# Patient Record
Sex: MALE | Race: WHITE | NOT HISPANIC OR LATINO | URBAN - METROPOLITAN AREA
[De-identification: names, ages, dates, MRNs, and addresses within clinical notes are randomized per-mention and may not be internally consistent; named-entity substitution may affect disease eponyms.]

---

## 2017-01-19 ENCOUNTER — OUTPATIENT (OUTPATIENT)
Dept: OUTPATIENT SERVICES | Facility: HOSPITAL | Age: 77
LOS: 1 days | Discharge: HOME | End: 2017-01-19

## 2017-06-15 ENCOUNTER — OUTPATIENT (OUTPATIENT)
Dept: OUTPATIENT SERVICES | Facility: HOSPITAL | Age: 77
LOS: 1 days | Discharge: HOME | End: 2017-06-15

## 2017-06-15 DIAGNOSIS — I25.10 ATHEROSCLEROTIC HEART DISEASE OF NATIVE CORONARY ARTERY WITHOUT ANGINA PECTORIS: ICD-10-CM

## 2017-06-15 DIAGNOSIS — E11.9 TYPE 2 DIABETES MELLITUS WITHOUT COMPLICATIONS: ICD-10-CM

## 2017-06-15 DIAGNOSIS — I20.9 ANGINA PECTORIS, UNSPECIFIED: ICD-10-CM

## 2017-06-15 DIAGNOSIS — E78.5 HYPERLIPIDEMIA, UNSPECIFIED: ICD-10-CM

## 2017-06-15 DIAGNOSIS — I10 ESSENTIAL (PRIMARY) HYPERTENSION: ICD-10-CM

## 2017-06-28 DIAGNOSIS — I82.91 CHRONIC EMBOLISM AND THROMBOSIS OF UNSPECIFIED VEIN: ICD-10-CM

## 2017-06-28 DIAGNOSIS — Z79.01 LONG TERM (CURRENT) USE OF ANTICOAGULANTS: ICD-10-CM

## 2017-07-13 ENCOUNTER — OUTPATIENT (OUTPATIENT)
Dept: OUTPATIENT SERVICES | Facility: HOSPITAL | Age: 77
LOS: 1 days | Discharge: HOME | End: 2017-07-13

## 2017-07-13 DIAGNOSIS — E11.9 TYPE 2 DIABETES MELLITUS WITHOUT COMPLICATIONS: ICD-10-CM

## 2017-07-13 DIAGNOSIS — E78.5 HYPERLIPIDEMIA, UNSPECIFIED: ICD-10-CM

## 2017-07-13 DIAGNOSIS — I25.10 ATHEROSCLEROTIC HEART DISEASE OF NATIVE CORONARY ARTERY WITHOUT ANGINA PECTORIS: ICD-10-CM

## 2017-07-13 DIAGNOSIS — I82.91 CHRONIC EMBOLISM AND THROMBOSIS OF UNSPECIFIED VEIN: ICD-10-CM

## 2017-07-13 DIAGNOSIS — I20.9 ANGINA PECTORIS, UNSPECIFIED: ICD-10-CM

## 2017-07-13 DIAGNOSIS — I10 ESSENTIAL (PRIMARY) HYPERTENSION: ICD-10-CM

## 2017-07-13 DIAGNOSIS — Z79.01 LONG TERM (CURRENT) USE OF ANTICOAGULANTS: ICD-10-CM

## 2017-08-10 ENCOUNTER — OUTPATIENT (OUTPATIENT)
Dept: OUTPATIENT SERVICES | Facility: HOSPITAL | Age: 77
LOS: 1 days | Discharge: HOME | End: 2017-08-10

## 2017-08-10 DIAGNOSIS — I20.9 ANGINA PECTORIS, UNSPECIFIED: ICD-10-CM

## 2017-08-10 DIAGNOSIS — Z79.01 LONG TERM (CURRENT) USE OF ANTICOAGULANTS: ICD-10-CM

## 2017-08-10 DIAGNOSIS — I10 ESSENTIAL (PRIMARY) HYPERTENSION: ICD-10-CM

## 2017-08-10 DIAGNOSIS — E11.9 TYPE 2 DIABETES MELLITUS WITHOUT COMPLICATIONS: ICD-10-CM

## 2017-08-10 DIAGNOSIS — E78.5 HYPERLIPIDEMIA, UNSPECIFIED: ICD-10-CM

## 2017-08-10 DIAGNOSIS — I82.91 CHRONIC EMBOLISM AND THROMBOSIS OF UNSPECIFIED VEIN: ICD-10-CM

## 2017-08-10 DIAGNOSIS — I25.10 ATHEROSCLEROTIC HEART DISEASE OF NATIVE CORONARY ARTERY WITHOUT ANGINA PECTORIS: ICD-10-CM

## 2017-09-05 ENCOUNTER — OUTPATIENT (OUTPATIENT)
Dept: OUTPATIENT SERVICES | Facility: HOSPITAL | Age: 77
LOS: 1 days | Discharge: HOME | End: 2017-09-05

## 2017-09-05 DIAGNOSIS — Z79.01 LONG TERM (CURRENT) USE OF ANTICOAGULANTS: ICD-10-CM

## 2017-09-05 DIAGNOSIS — I25.10 ATHEROSCLEROTIC HEART DISEASE OF NATIVE CORONARY ARTERY WITHOUT ANGINA PECTORIS: ICD-10-CM

## 2017-09-05 DIAGNOSIS — E11.9 TYPE 2 DIABETES MELLITUS WITHOUT COMPLICATIONS: ICD-10-CM

## 2017-09-05 DIAGNOSIS — I82.91 CHRONIC EMBOLISM AND THROMBOSIS OF UNSPECIFIED VEIN: ICD-10-CM

## 2017-09-05 DIAGNOSIS — E78.5 HYPERLIPIDEMIA, UNSPECIFIED: ICD-10-CM

## 2017-09-05 DIAGNOSIS — I10 ESSENTIAL (PRIMARY) HYPERTENSION: ICD-10-CM

## 2017-09-05 DIAGNOSIS — I20.9 ANGINA PECTORIS, UNSPECIFIED: ICD-10-CM

## 2017-09-07 DIAGNOSIS — Z79.01 LONG TERM (CURRENT) USE OF ANTICOAGULANTS: ICD-10-CM

## 2017-09-07 DIAGNOSIS — I82.91 CHRONIC EMBOLISM AND THROMBOSIS OF UNSPECIFIED VEIN: ICD-10-CM

## 2017-10-17 ENCOUNTER — OUTPATIENT (OUTPATIENT)
Dept: OUTPATIENT SERVICES | Facility: HOSPITAL | Age: 77
LOS: 1 days | Discharge: HOME | End: 2017-10-17

## 2017-10-17 DIAGNOSIS — I82.91 CHRONIC EMBOLISM AND THROMBOSIS OF UNSPECIFIED VEIN: ICD-10-CM

## 2017-10-17 DIAGNOSIS — E78.5 HYPERLIPIDEMIA, UNSPECIFIED: ICD-10-CM

## 2017-10-17 DIAGNOSIS — I25.10 ATHEROSCLEROTIC HEART DISEASE OF NATIVE CORONARY ARTERY WITHOUT ANGINA PECTORIS: ICD-10-CM

## 2017-10-17 DIAGNOSIS — Z79.01 LONG TERM (CURRENT) USE OF ANTICOAGULANTS: ICD-10-CM

## 2017-10-17 DIAGNOSIS — E11.9 TYPE 2 DIABETES MELLITUS WITHOUT COMPLICATIONS: ICD-10-CM

## 2017-10-17 DIAGNOSIS — I20.9 ANGINA PECTORIS, UNSPECIFIED: ICD-10-CM

## 2017-10-17 DIAGNOSIS — I10 ESSENTIAL (PRIMARY) HYPERTENSION: ICD-10-CM

## 2017-11-28 ENCOUNTER — OUTPATIENT (OUTPATIENT)
Dept: OUTPATIENT SERVICES | Facility: HOSPITAL | Age: 77
LOS: 1 days | Discharge: HOME | End: 2017-11-28

## 2017-11-28 DIAGNOSIS — I20.9 ANGINA PECTORIS, UNSPECIFIED: ICD-10-CM

## 2017-11-28 DIAGNOSIS — I25.10 ATHEROSCLEROTIC HEART DISEASE OF NATIVE CORONARY ARTERY WITHOUT ANGINA PECTORIS: ICD-10-CM

## 2017-11-28 DIAGNOSIS — I10 ESSENTIAL (PRIMARY) HYPERTENSION: ICD-10-CM

## 2017-11-28 DIAGNOSIS — I82.91 CHRONIC EMBOLISM AND THROMBOSIS OF UNSPECIFIED VEIN: ICD-10-CM

## 2017-11-28 DIAGNOSIS — Z79.01 LONG TERM (CURRENT) USE OF ANTICOAGULANTS: ICD-10-CM

## 2017-11-28 DIAGNOSIS — E78.5 HYPERLIPIDEMIA, UNSPECIFIED: ICD-10-CM

## 2017-11-28 DIAGNOSIS — E11.9 TYPE 2 DIABETES MELLITUS WITHOUT COMPLICATIONS: ICD-10-CM

## 2018-01-09 ENCOUNTER — OUTPATIENT (OUTPATIENT)
Dept: OUTPATIENT SERVICES | Facility: HOSPITAL | Age: 78
LOS: 1 days | Discharge: HOME | End: 2018-01-09

## 2018-01-09 DIAGNOSIS — I10 ESSENTIAL (PRIMARY) HYPERTENSION: ICD-10-CM

## 2018-01-09 DIAGNOSIS — Z79.01 LONG TERM (CURRENT) USE OF ANTICOAGULANTS: ICD-10-CM

## 2018-01-09 DIAGNOSIS — I82.91 CHRONIC EMBOLISM AND THROMBOSIS OF UNSPECIFIED VEIN: ICD-10-CM

## 2018-01-09 DIAGNOSIS — E78.5 HYPERLIPIDEMIA, UNSPECIFIED: ICD-10-CM

## 2018-01-09 DIAGNOSIS — I25.10 ATHEROSCLEROTIC HEART DISEASE OF NATIVE CORONARY ARTERY WITHOUT ANGINA PECTORIS: ICD-10-CM

## 2018-01-09 DIAGNOSIS — I20.9 ANGINA PECTORIS, UNSPECIFIED: ICD-10-CM

## 2018-01-09 DIAGNOSIS — E11.9 TYPE 2 DIABETES MELLITUS WITHOUT COMPLICATIONS: ICD-10-CM

## 2018-02-06 ENCOUNTER — OUTPATIENT (OUTPATIENT)
Dept: OUTPATIENT SERVICES | Facility: HOSPITAL | Age: 78
LOS: 1 days | Discharge: HOME | End: 2018-02-06

## 2018-02-06 DIAGNOSIS — Z79.01 LONG TERM (CURRENT) USE OF ANTICOAGULANTS: ICD-10-CM

## 2018-02-06 DIAGNOSIS — I82.91 CHRONIC EMBOLISM AND THROMBOSIS OF UNSPECIFIED VEIN: ICD-10-CM

## 2018-02-13 ENCOUNTER — OUTPATIENT (OUTPATIENT)
Dept: OUTPATIENT SERVICES | Facility: HOSPITAL | Age: 78
LOS: 1 days | Discharge: HOME | End: 2018-02-13

## 2018-02-13 DIAGNOSIS — Z79.01 LONG TERM (CURRENT) USE OF ANTICOAGULANTS: ICD-10-CM

## 2018-02-13 DIAGNOSIS — I82.91 CHRONIC EMBOLISM AND THROMBOSIS OF UNSPECIFIED VEIN: ICD-10-CM

## 2018-02-27 ENCOUNTER — OUTPATIENT (OUTPATIENT)
Dept: OUTPATIENT SERVICES | Facility: HOSPITAL | Age: 78
LOS: 1 days | Discharge: HOME | End: 2018-02-27

## 2018-02-27 DIAGNOSIS — I82.91 CHRONIC EMBOLISM AND THROMBOSIS OF UNSPECIFIED VEIN: ICD-10-CM

## 2018-02-27 DIAGNOSIS — Z79.01 LONG TERM (CURRENT) USE OF ANTICOAGULANTS: ICD-10-CM

## 2018-03-27 ENCOUNTER — OUTPATIENT (OUTPATIENT)
Dept: OUTPATIENT SERVICES | Facility: HOSPITAL | Age: 78
LOS: 1 days | Discharge: HOME | End: 2018-03-27

## 2018-03-27 DIAGNOSIS — Z79.01 LONG TERM (CURRENT) USE OF ANTICOAGULANTS: ICD-10-CM

## 2018-03-27 DIAGNOSIS — I82.91 CHRONIC EMBOLISM AND THROMBOSIS OF UNSPECIFIED VEIN: ICD-10-CM

## 2018-05-08 ENCOUNTER — OUTPATIENT (OUTPATIENT)
Dept: OUTPATIENT SERVICES | Facility: HOSPITAL | Age: 78
LOS: 1 days | Discharge: HOME | End: 2018-05-08

## 2018-05-08 DIAGNOSIS — I82.91 CHRONIC EMBOLISM AND THROMBOSIS OF UNSPECIFIED VEIN: ICD-10-CM

## 2018-05-08 DIAGNOSIS — Z79.01 LONG TERM (CURRENT) USE OF ANTICOAGULANTS: ICD-10-CM

## 2018-06-19 ENCOUNTER — OUTPATIENT (OUTPATIENT)
Dept: OUTPATIENT SERVICES | Facility: HOSPITAL | Age: 78
LOS: 1 days | Discharge: HOME | End: 2018-06-19

## 2018-06-19 DIAGNOSIS — Z79.01 LONG TERM (CURRENT) USE OF ANTICOAGULANTS: ICD-10-CM

## 2018-06-19 DIAGNOSIS — I82.91 CHRONIC EMBOLISM AND THROMBOSIS OF UNSPECIFIED VEIN: ICD-10-CM

## 2018-08-07 ENCOUNTER — OUTPATIENT (OUTPATIENT)
Dept: OUTPATIENT SERVICES | Facility: HOSPITAL | Age: 78
LOS: 1 days | Discharge: HOME | End: 2018-08-07

## 2018-08-07 DIAGNOSIS — Z79.01 LONG TERM (CURRENT) USE OF ANTICOAGULANTS: ICD-10-CM

## 2018-08-07 DIAGNOSIS — I82.91 CHRONIC EMBOLISM AND THROMBOSIS OF UNSPECIFIED VEIN: ICD-10-CM

## 2018-09-21 ENCOUNTER — OUTPATIENT (OUTPATIENT)
Dept: OUTPATIENT SERVICES | Facility: HOSPITAL | Age: 78
LOS: 1 days | Discharge: HOME | End: 2018-09-21

## 2018-09-21 DIAGNOSIS — Z79.01 LONG TERM (CURRENT) USE OF ANTICOAGULANTS: ICD-10-CM

## 2018-09-21 DIAGNOSIS — I82.91 CHRONIC EMBOLISM AND THROMBOSIS OF UNSPECIFIED VEIN: ICD-10-CM

## 2018-09-21 LAB
POCT INR: 2.8 RATIO — HIGH (ref 0.9–1.2)
POCT PT: 33.5 SEC — HIGH (ref 10–13.4)

## 2018-10-24 ENCOUNTER — OUTPATIENT (OUTPATIENT)
Dept: OUTPATIENT SERVICES | Facility: HOSPITAL | Age: 78
LOS: 1 days | Discharge: HOME | End: 2018-10-24

## 2018-10-24 DIAGNOSIS — Z79.01 LONG TERM (CURRENT) USE OF ANTICOAGULANTS: ICD-10-CM

## 2018-10-24 DIAGNOSIS — I82.91 CHRONIC EMBOLISM AND THROMBOSIS OF UNSPECIFIED VEIN: ICD-10-CM

## 2018-10-24 LAB
POCT INR: 2.6 RATIO — HIGH (ref 0.9–1.2)
POCT PT: 31.2 SEC — HIGH (ref 10–13.4)

## 2018-10-29 ENCOUNTER — OUTPATIENT (OUTPATIENT)
Dept: OUTPATIENT SERVICES | Facility: HOSPITAL | Age: 78
LOS: 1 days | Discharge: HOME | End: 2018-10-29

## 2018-10-29 DIAGNOSIS — I82.91 CHRONIC EMBOLISM AND THROMBOSIS OF UNSPECIFIED VEIN: ICD-10-CM

## 2018-10-29 DIAGNOSIS — Z79.01 LONG TERM (CURRENT) USE OF ANTICOAGULANTS: ICD-10-CM

## 2018-12-11 ENCOUNTER — OUTPATIENT (OUTPATIENT)
Dept: OUTPATIENT SERVICES | Facility: HOSPITAL | Age: 78
LOS: 1 days | Discharge: HOME | End: 2018-12-11

## 2018-12-11 DIAGNOSIS — Z79.01 LONG TERM (CURRENT) USE OF ANTICOAGULANTS: ICD-10-CM

## 2018-12-11 DIAGNOSIS — I82.91 CHRONIC EMBOLISM AND THROMBOSIS OF UNSPECIFIED VEIN: ICD-10-CM

## 2018-12-11 LAB
POCT INR: 2.5 RATIO — HIGH (ref 0.9–1.2)
POCT PT: 30.4 SEC — HIGH (ref 10–13.4)

## 2019-01-22 ENCOUNTER — OUTPATIENT (OUTPATIENT)
Dept: OUTPATIENT SERVICES | Facility: HOSPITAL | Age: 79
LOS: 1 days | Discharge: HOME | End: 2019-01-22

## 2019-01-22 DIAGNOSIS — I82.91 CHRONIC EMBOLISM AND THROMBOSIS OF UNSPECIFIED VEIN: ICD-10-CM

## 2019-01-22 DIAGNOSIS — Z79.01 LONG TERM (CURRENT) USE OF ANTICOAGULANTS: ICD-10-CM

## 2019-01-22 LAB
POCT INR: 2 RATIO — HIGH (ref 0.9–1.2)
POCT PT: 24.6 SEC — HIGH (ref 10–13.4)

## 2019-03-07 ENCOUNTER — OUTPATIENT (OUTPATIENT)
Dept: OUTPATIENT SERVICES | Facility: HOSPITAL | Age: 79
LOS: 1 days | Discharge: HOME | End: 2019-03-07

## 2019-03-07 DIAGNOSIS — Z79.01 LONG TERM (CURRENT) USE OF ANTICOAGULANTS: ICD-10-CM

## 2019-03-07 DIAGNOSIS — I82.91 CHRONIC EMBOLISM AND THROMBOSIS OF UNSPECIFIED VEIN: ICD-10-CM

## 2019-03-07 LAB
POCT INR: 2.1 RATIO — HIGH (ref 0.9–1.2)
POCT PT: 25.1 SEC — HIGH (ref 10–13.4)

## 2019-04-18 ENCOUNTER — OUTPATIENT (OUTPATIENT)
Dept: OUTPATIENT SERVICES | Facility: HOSPITAL | Age: 79
LOS: 1 days | Discharge: HOME | End: 2019-04-18

## 2019-04-18 DIAGNOSIS — Z79.01 LONG TERM (CURRENT) USE OF ANTICOAGULANTS: ICD-10-CM

## 2019-04-18 DIAGNOSIS — I82.91 CHRONIC EMBOLISM AND THROMBOSIS OF UNSPECIFIED VEIN: ICD-10-CM

## 2019-04-18 LAB
POCT INR: 1.7 RATIO — HIGH (ref 0.9–1.2)
POCT PT: 20.2 SEC — HIGH (ref 10–13.4)

## 2019-05-02 ENCOUNTER — OUTPATIENT (OUTPATIENT)
Dept: OUTPATIENT SERVICES | Facility: HOSPITAL | Age: 79
LOS: 1 days | Discharge: HOME | End: 2019-05-02

## 2019-05-02 DIAGNOSIS — I82.91 CHRONIC EMBOLISM AND THROMBOSIS OF UNSPECIFIED VEIN: ICD-10-CM

## 2019-05-02 DIAGNOSIS — Z79.01 LONG TERM (CURRENT) USE OF ANTICOAGULANTS: ICD-10-CM

## 2019-05-02 LAB
POCT INR: 2.2 RATIO — HIGH (ref 0.9–1.2)
POCT PT: 25.8 SEC — HIGH (ref 10–13.4)

## 2019-06-06 ENCOUNTER — OUTPATIENT (OUTPATIENT)
Dept: OUTPATIENT SERVICES | Facility: HOSPITAL | Age: 79
LOS: 1 days | Discharge: HOME | End: 2019-06-06

## 2019-06-06 DIAGNOSIS — Z79.01 LONG TERM (CURRENT) USE OF ANTICOAGULANTS: ICD-10-CM

## 2019-06-06 DIAGNOSIS — I82.91 CHRONIC EMBOLISM AND THROMBOSIS OF UNSPECIFIED VEIN: ICD-10-CM

## 2019-06-06 LAB
POCT INR: 2.1 RATIO — HIGH (ref 0.9–1.2)
POCT PT: 25.2 SEC — HIGH (ref 10–13.4)

## 2019-07-18 ENCOUNTER — OUTPATIENT (OUTPATIENT)
Dept: OUTPATIENT SERVICES | Facility: HOSPITAL | Age: 79
LOS: 1 days | Discharge: HOME | End: 2019-07-18

## 2019-07-18 DIAGNOSIS — Z79.01 LONG TERM (CURRENT) USE OF ANTICOAGULANTS: ICD-10-CM

## 2019-07-18 DIAGNOSIS — I82.91 CHRONIC EMBOLISM AND THROMBOSIS OF UNSPECIFIED VEIN: ICD-10-CM

## 2019-07-18 LAB
POCT INR: 1.8 RATIO — HIGH (ref 0.9–1.2)
POCT PT: 22.3 SEC — HIGH (ref 10–13.4)

## 2019-08-08 ENCOUNTER — OUTPATIENT (OUTPATIENT)
Dept: OUTPATIENT SERVICES | Facility: HOSPITAL | Age: 79
LOS: 1 days | Discharge: HOME | End: 2019-08-08

## 2019-08-08 DIAGNOSIS — Z79.01 LONG TERM (CURRENT) USE OF ANTICOAGULANTS: ICD-10-CM

## 2019-08-08 DIAGNOSIS — I82.91 CHRONIC EMBOLISM AND THROMBOSIS OF UNSPECIFIED VEIN: ICD-10-CM

## 2019-08-08 LAB
POCT INR: 2.2 RATIO — HIGH (ref 0.9–1.2)
POCT PT: 26.4 SEC — HIGH (ref 10–13.4)

## 2019-08-22 ENCOUNTER — OUTPATIENT (OUTPATIENT)
Dept: OUTPATIENT SERVICES | Facility: HOSPITAL | Age: 79
LOS: 1 days | Discharge: HOME | End: 2019-08-22

## 2019-08-22 DIAGNOSIS — I82.91 CHRONIC EMBOLISM AND THROMBOSIS OF UNSPECIFIED VEIN: ICD-10-CM

## 2019-08-22 DIAGNOSIS — Z79.01 LONG TERM (CURRENT) USE OF ANTICOAGULANTS: ICD-10-CM

## 2019-08-22 LAB
POCT INR: 2.1 RATIO — HIGH (ref 0.9–1.2)
POCT PT: 25.5 SEC — HIGH (ref 10–13.4)

## 2019-10-03 ENCOUNTER — OUTPATIENT (OUTPATIENT)
Dept: OUTPATIENT SERVICES | Facility: HOSPITAL | Age: 79
LOS: 1 days | Discharge: HOME | End: 2019-10-03

## 2019-10-03 DIAGNOSIS — I82.91 CHRONIC EMBOLISM AND THROMBOSIS OF UNSPECIFIED VEIN: ICD-10-CM

## 2019-10-03 DIAGNOSIS — Z79.01 LONG TERM (CURRENT) USE OF ANTICOAGULANTS: ICD-10-CM

## 2019-10-03 LAB
POCT INR: 3.6 RATIO — HIGH (ref 0.9–1.2)
POCT PT: 43.2 SEC — HIGH (ref 10–13.4)

## 2019-10-10 ENCOUNTER — OUTPATIENT (OUTPATIENT)
Dept: OUTPATIENT SERVICES | Facility: HOSPITAL | Age: 79
LOS: 1 days | Discharge: HOME | End: 2019-10-10

## 2019-10-10 DIAGNOSIS — I82.91 CHRONIC EMBOLISM AND THROMBOSIS OF UNSPECIFIED VEIN: ICD-10-CM

## 2019-10-10 DIAGNOSIS — Z79.01 LONG TERM (CURRENT) USE OF ANTICOAGULANTS: ICD-10-CM

## 2019-10-10 LAB
POCT INR: 2.3 RATIO — HIGH (ref 0.9–1.2)
POCT PT: 27.9 SEC — HIGH (ref 10–13.4)

## 2019-10-22 ENCOUNTER — OUTPATIENT (OUTPATIENT)
Dept: OUTPATIENT SERVICES | Facility: HOSPITAL | Age: 79
LOS: 1 days | Discharge: HOME | End: 2019-10-22

## 2019-10-22 DIAGNOSIS — I82.91 CHRONIC EMBOLISM AND THROMBOSIS OF UNSPECIFIED VEIN: ICD-10-CM

## 2019-10-22 DIAGNOSIS — Z79.01 LONG TERM (CURRENT) USE OF ANTICOAGULANTS: ICD-10-CM

## 2019-10-22 LAB
POCT INR: 2.8 RATIO — HIGH (ref 0.9–1.2)
POCT PT: 33.6 SEC — HIGH (ref 10–13.4)

## 2019-11-21 ENCOUNTER — OUTPATIENT (OUTPATIENT)
Dept: OUTPATIENT SERVICES | Facility: HOSPITAL | Age: 79
LOS: 1 days | Discharge: HOME | End: 2019-11-21

## 2019-11-21 DIAGNOSIS — I82.91 CHRONIC EMBOLISM AND THROMBOSIS OF UNSPECIFIED VEIN: ICD-10-CM

## 2019-11-21 DIAGNOSIS — Z79.01 LONG TERM (CURRENT) USE OF ANTICOAGULANTS: ICD-10-CM

## 2019-11-21 LAB
POCT INR: 2.7 RATIO — HIGH (ref 0.9–1.2)
POCT PT: 32.8 SEC — HIGH (ref 10–13.4)

## 2019-12-26 ENCOUNTER — OUTPATIENT (OUTPATIENT)
Dept: OUTPATIENT SERVICES | Facility: HOSPITAL | Age: 79
LOS: 1 days | Discharge: HOME | End: 2019-12-26

## 2019-12-26 DIAGNOSIS — Z79.01 LONG TERM (CURRENT) USE OF ANTICOAGULANTS: ICD-10-CM

## 2019-12-26 DIAGNOSIS — I82.91 CHRONIC EMBOLISM AND THROMBOSIS OF UNSPECIFIED VEIN: ICD-10-CM

## 2019-12-26 LAB
POCT INR: 2.4 RATIO — HIGH (ref 0.9–1.2)
POCT PT: 29 SEC — HIGH (ref 10–13.4)

## 2020-01-28 ENCOUNTER — OUTPATIENT (OUTPATIENT)
Dept: OUTPATIENT SERVICES | Facility: HOSPITAL | Age: 80
LOS: 1 days | Discharge: HOME | End: 2020-01-28

## 2020-01-28 DIAGNOSIS — Z79.01 LONG TERM (CURRENT) USE OF ANTICOAGULANTS: ICD-10-CM

## 2020-01-28 DIAGNOSIS — I82.91 CHRONIC EMBOLISM AND THROMBOSIS OF UNSPECIFIED VEIN: ICD-10-CM

## 2020-01-28 LAB
POCT INR: 2.7 RATIO — HIGH (ref 0.9–1.2)
POCT PT: 33 SEC — HIGH (ref 10–13.4)

## 2020-03-10 ENCOUNTER — OUTPATIENT (OUTPATIENT)
Dept: OUTPATIENT SERVICES | Facility: HOSPITAL | Age: 80
LOS: 1 days | Discharge: HOME | End: 2020-03-10

## 2020-03-10 DIAGNOSIS — Z79.01 LONG TERM (CURRENT) USE OF ANTICOAGULANTS: ICD-10-CM

## 2020-03-10 DIAGNOSIS — I82.91 CHRONIC EMBOLISM AND THROMBOSIS OF UNSPECIFIED VEIN: ICD-10-CM

## 2020-03-10 LAB
POCT INR: 2.8 RATIO — HIGH (ref 0.9–1.2)
POCT PT: 33.4 SEC — HIGH (ref 10–13.4)

## 2020-04-21 ENCOUNTER — OUTPATIENT (OUTPATIENT)
Dept: OUTPATIENT SERVICES | Facility: HOSPITAL | Age: 80
LOS: 1 days | Discharge: HOME | End: 2020-04-21

## 2020-04-21 DIAGNOSIS — I82.91 CHRONIC EMBOLISM AND THROMBOSIS OF UNSPECIFIED VEIN: ICD-10-CM

## 2020-04-21 DIAGNOSIS — Z79.01 LONG TERM (CURRENT) USE OF ANTICOAGULANTS: ICD-10-CM

## 2020-04-21 LAB
POCT INR: 3.2 RATIO — HIGH (ref 0.9–1.2)
POCT PT: 38.7 SEC — HIGH (ref 10–13.4)

## 2020-05-19 ENCOUNTER — OUTPATIENT (OUTPATIENT)
Dept: OUTPATIENT SERVICES | Facility: HOSPITAL | Age: 80
LOS: 1 days | Discharge: HOME | End: 2020-05-19

## 2020-05-19 DIAGNOSIS — Z79.01 LONG TERM (CURRENT) USE OF ANTICOAGULANTS: ICD-10-CM

## 2020-05-19 DIAGNOSIS — I82.91 CHRONIC EMBOLISM AND THROMBOSIS OF UNSPECIFIED VEIN: ICD-10-CM

## 2020-05-19 LAB
POCT INR: 2.4 RATIO — HIGH (ref 0.9–1.2)
POCT PT: 28.7 SEC — HIGH (ref 10–13.4)

## 2020-06-30 ENCOUNTER — OUTPATIENT (OUTPATIENT)
Dept: OUTPATIENT SERVICES | Facility: HOSPITAL | Age: 80
LOS: 1 days | Discharge: HOME | End: 2020-06-30

## 2020-06-30 DIAGNOSIS — I82.91 CHRONIC EMBOLISM AND THROMBOSIS OF UNSPECIFIED VEIN: ICD-10-CM

## 2020-06-30 DIAGNOSIS — Z79.01 LONG TERM (CURRENT) USE OF ANTICOAGULANTS: ICD-10-CM

## 2020-06-30 LAB
POCT INR: 2.4 RATIO — HIGH (ref 0.9–1.2)
POCT PT: 28.6 SEC — HIGH (ref 10–13.4)

## 2020-08-06 ENCOUNTER — RECORD ABSTRACTING (OUTPATIENT)
Age: 80
End: 2020-08-06

## 2020-08-06 DIAGNOSIS — Z87.898 PERSONAL HISTORY OF OTHER SPECIFIED CONDITIONS: ICD-10-CM

## 2020-08-06 DIAGNOSIS — Z78.9 OTHER SPECIFIED HEALTH STATUS: ICD-10-CM

## 2020-08-06 DIAGNOSIS — I10 ESSENTIAL (PRIMARY) HYPERTENSION: ICD-10-CM

## 2020-08-06 DIAGNOSIS — I82.91 CHRONIC EMBOLISM AND THROMBOSIS OF UNSPECIFIED VEIN: ICD-10-CM

## 2020-08-06 DIAGNOSIS — E11.9 TYPE 2 DIABETES MELLITUS W/OUT COMPLICATIONS: ICD-10-CM

## 2020-08-06 DIAGNOSIS — Z79.4 LONG TERM (CURRENT) USE OF INSULIN: ICD-10-CM

## 2020-08-06 DIAGNOSIS — I50.9 HEART FAILURE, UNSPECIFIED: ICD-10-CM

## 2020-08-06 PROBLEM — Z00.00 ENCOUNTER FOR PREVENTIVE HEALTH EXAMINATION: Status: ACTIVE | Noted: 2020-08-06

## 2020-08-06 RX ORDER — SITAGLIPTIN 50 MG/1
50 TABLET, FILM COATED ORAL
Refills: 0 | Status: ACTIVE | COMMUNITY

## 2020-08-06 RX ORDER — SIMVASTATIN 40 MG/1
40 TABLET, FILM COATED ORAL
Refills: 0 | Status: ACTIVE | COMMUNITY

## 2020-08-06 RX ORDER — CARVEDILOL 6.25 MG/1
6.25 TABLET, FILM COATED ORAL
Refills: 0 | Status: ACTIVE | COMMUNITY

## 2020-08-06 RX ORDER — ASPIRIN 81 MG
81 TABLET, DELAYED RELEASE (ENTERIC COATED) ORAL
Refills: 0 | Status: ACTIVE | COMMUNITY

## 2020-08-06 RX ORDER — WARFARIN 3 MG/1
3 TABLET ORAL
Refills: 0 | Status: ACTIVE | COMMUNITY

## 2020-08-06 RX ORDER — GLIPIZIDE 5 MG/1
5 TABLET ORAL
Refills: 0 | Status: ACTIVE | COMMUNITY

## 2020-08-06 RX ORDER — FUROSEMIDE 40 MG/1
40 TABLET ORAL
Refills: 0 | Status: ACTIVE | COMMUNITY

## 2020-08-06 RX ORDER — FOLIC ACID 1 MG/1
1 TABLET ORAL
Refills: 0 | Status: ACTIVE | COMMUNITY

## 2020-08-06 RX ORDER — METFORMIN HYDROCHLORIDE 1000 MG/1
1000 TABLET, COATED ORAL
Refills: 0 | Status: ACTIVE | COMMUNITY

## 2020-08-06 RX ORDER — PANTOPRAZOLE 40 MG/1
40 TABLET, DELAYED RELEASE ORAL
Refills: 0 | Status: ACTIVE | COMMUNITY

## 2020-08-11 ENCOUNTER — OUTPATIENT (OUTPATIENT)
Dept: OUTPATIENT SERVICES | Facility: HOSPITAL | Age: 80
LOS: 1 days | Discharge: HOME | End: 2020-08-11

## 2020-08-11 ENCOUNTER — APPOINTMENT (OUTPATIENT)
Dept: MEDICATION MANAGEMENT | Facility: CLINIC | Age: 80
End: 2020-08-11

## 2020-08-11 VITALS — OXYGEN SATURATION: 98 % | RESPIRATION RATE: 16 BRPM | HEART RATE: 56 BPM

## 2020-08-11 DIAGNOSIS — I26.09 OTHER PULMONARY EMBOLISM WITH ACUTE COR PULMONALE: ICD-10-CM

## 2020-08-11 DIAGNOSIS — Z79.01 LONG TERM (CURRENT) USE OF ANTICOAGULANTS: ICD-10-CM

## 2020-08-11 DIAGNOSIS — I82.91 CHRONIC EMBOLISM AND THROMBOSIS OF UNSPECIFIED VEIN: ICD-10-CM

## 2020-08-11 DIAGNOSIS — Z79.01 ENCOUNTER FOR THERAPEUTIC DRUG LVL MONITORING: ICD-10-CM

## 2020-08-11 DIAGNOSIS — Z51.81 ENCOUNTER FOR THERAPEUTIC DRUG LVL MONITORING: ICD-10-CM

## 2020-08-11 LAB
INR PPP: 3.1 RATIO
POCT-PROTHROMBIN TIME: 37.1 SECS
QUALITY CONTROL: YES

## 2020-09-15 ENCOUNTER — APPOINTMENT (OUTPATIENT)
Dept: MEDICATION MANAGEMENT | Facility: CLINIC | Age: 80
End: 2020-09-15

## 2020-09-15 ENCOUNTER — OUTPATIENT (OUTPATIENT)
Dept: OUTPATIENT SERVICES | Facility: HOSPITAL | Age: 80
LOS: 1 days | Discharge: HOME | End: 2020-09-15

## 2020-09-15 VITALS — OXYGEN SATURATION: 98 % | HEART RATE: 53 BPM | RESPIRATION RATE: 18 BRPM

## 2020-09-15 DIAGNOSIS — I82.91 CHRONIC EMBOLISM AND THROMBOSIS OF UNSPECIFIED VEIN: ICD-10-CM

## 2020-09-15 DIAGNOSIS — Z79.01 LONG TERM (CURRENT) USE OF ANTICOAGULANTS: ICD-10-CM

## 2020-09-15 DIAGNOSIS — Z86.79 PERSONAL HISTORY OF OTHER DISEASES OF THE CIRCULATORY SYSTEM: ICD-10-CM

## 2020-09-15 LAB
INR PPP: 2.6 RATIO
POCT-PROTHROMBIN TIME: 31 SECS
QUALITY CONTROL: YES

## 2020-09-15 RX ORDER — LISINOPRIL 5 MG/1
5 TABLET ORAL
Refills: 0 | Status: ACTIVE | COMMUNITY

## 2020-09-15 RX ORDER — GLYBURIDE 5 MG/1
5 TABLET ORAL
Refills: 0 | Status: ACTIVE | COMMUNITY

## 2020-10-20 ENCOUNTER — APPOINTMENT (OUTPATIENT)
Dept: MEDICATION MANAGEMENT | Facility: CLINIC | Age: 80
End: 2020-10-20

## 2020-10-20 ENCOUNTER — OUTPATIENT (OUTPATIENT)
Dept: OUTPATIENT SERVICES | Facility: HOSPITAL | Age: 80
LOS: 1 days | Discharge: HOME | End: 2020-10-20

## 2020-10-20 VITALS — RESPIRATION RATE: 18 BRPM | OXYGEN SATURATION: 98 % | HEART RATE: 58 BPM

## 2020-10-20 DIAGNOSIS — Z79.01 LONG TERM (CURRENT) USE OF ANTICOAGULANTS: ICD-10-CM

## 2020-10-20 DIAGNOSIS — I82.91 CHRONIC EMBOLISM AND THROMBOSIS OF UNSPECIFIED VEIN: ICD-10-CM

## 2020-10-20 LAB
INR PPP: 2.9 RATIO
POCT-PROTHROMBIN TIME: 35.2 SECS
QUALITY CONTROL: YES

## 2020-11-04 RX ORDER — CYCLOBENZAPRINE HYDROCHLORIDE 5 MG/1
5 TABLET, FILM COATED ORAL 3 TIMES DAILY
Refills: 0 | Status: ACTIVE | COMMUNITY
Start: 2020-11-04

## 2020-11-24 ENCOUNTER — OUTPATIENT (OUTPATIENT)
Dept: OUTPATIENT SERVICES | Facility: HOSPITAL | Age: 80
LOS: 1 days | Discharge: HOME | End: 2020-11-24

## 2020-11-24 ENCOUNTER — APPOINTMENT (OUTPATIENT)
Dept: MEDICATION MANAGEMENT | Facility: CLINIC | Age: 80
End: 2020-11-24

## 2020-11-24 VITALS — OXYGEN SATURATION: 98 % | RESPIRATION RATE: 18 BRPM | HEART RATE: 67 BPM

## 2020-11-24 DIAGNOSIS — I82.91 CHRONIC EMBOLISM AND THROMBOSIS OF UNSPECIFIED VEIN: ICD-10-CM

## 2020-11-24 DIAGNOSIS — Z79.01 LONG TERM (CURRENT) USE OF ANTICOAGULANTS: ICD-10-CM

## 2020-11-24 LAB
INR PPP: 3.8 RATIO
POCT-PROTHROMBIN TIME: 45.3 SECS
QUALITY CONTROL: YES

## 2020-12-08 ENCOUNTER — OUTPATIENT (OUTPATIENT)
Dept: OUTPATIENT SERVICES | Facility: HOSPITAL | Age: 80
LOS: 1 days | Discharge: HOME | End: 2020-12-08

## 2020-12-08 ENCOUNTER — APPOINTMENT (OUTPATIENT)
Dept: MEDICATION MANAGEMENT | Facility: CLINIC | Age: 80
End: 2020-12-08

## 2020-12-08 VITALS — HEART RATE: 63 BPM | RESPIRATION RATE: 18 BRPM | OXYGEN SATURATION: 97 %

## 2020-12-08 DIAGNOSIS — Z79.01 LONG TERM (CURRENT) USE OF ANTICOAGULANTS: ICD-10-CM

## 2020-12-08 DIAGNOSIS — I82.91 CHRONIC EMBOLISM AND THROMBOSIS OF UNSPECIFIED VEIN: ICD-10-CM

## 2020-12-08 LAB
INR PPP: 1.8 RATIO
POCT-PROTHROMBIN TIME: 22 SECS
QUALITY CONTROL: YES

## 2020-12-29 ENCOUNTER — APPOINTMENT (OUTPATIENT)
Dept: MEDICATION MANAGEMENT | Facility: CLINIC | Age: 80
End: 2020-12-29

## 2020-12-29 ENCOUNTER — OUTPATIENT (OUTPATIENT)
Dept: OUTPATIENT SERVICES | Facility: HOSPITAL | Age: 80
LOS: 1 days | Discharge: HOME | End: 2020-12-29

## 2020-12-29 VITALS — RESPIRATION RATE: 18 BRPM | HEART RATE: 63 BPM | OXYGEN SATURATION: 97 %

## 2020-12-29 DIAGNOSIS — I82.91 CHRONIC EMBOLISM AND THROMBOSIS OF UNSPECIFIED VEIN: ICD-10-CM

## 2020-12-29 DIAGNOSIS — Z79.01 LONG TERM (CURRENT) USE OF ANTICOAGULANTS: ICD-10-CM

## 2020-12-29 LAB
INR PPP: 3.7 RATIO
POCT-PROTHROMBIN TIME: 44.1 SECS
QUALITY CONTROL: YES

## 2021-01-05 ENCOUNTER — APPOINTMENT (OUTPATIENT)
Dept: MEDICATION MANAGEMENT | Facility: CLINIC | Age: 81
End: 2021-01-05

## 2021-01-05 ENCOUNTER — OUTPATIENT (OUTPATIENT)
Dept: OUTPATIENT SERVICES | Facility: HOSPITAL | Age: 81
LOS: 1 days | Discharge: HOME | End: 2021-01-05

## 2021-01-05 VITALS — HEART RATE: 65 BPM | RESPIRATION RATE: 18 BRPM | OXYGEN SATURATION: 98 %

## 2021-01-05 DIAGNOSIS — I82.91 CHRONIC EMBOLISM AND THROMBOSIS OF UNSPECIFIED VEIN: ICD-10-CM

## 2021-01-05 DIAGNOSIS — Z79.01 LONG TERM (CURRENT) USE OF ANTICOAGULANTS: ICD-10-CM

## 2021-01-05 LAB
INR PPP: 2.6 RATIO
POCT-PROTHROMBIN TIME: 31.2 SECS
QUALITY CONTROL: YES

## 2021-01-13 VITALS — WEIGHT: 242 LBS

## 2021-01-20 ENCOUNTER — OUTPATIENT (OUTPATIENT)
Dept: OUTPATIENT SERVICES | Facility: HOSPITAL | Age: 81
LOS: 1 days | Discharge: HOME | End: 2021-01-20

## 2021-01-20 ENCOUNTER — APPOINTMENT (OUTPATIENT)
Dept: MEDICATION MANAGEMENT | Facility: CLINIC | Age: 81
End: 2021-01-20

## 2021-01-20 VITALS — OXYGEN SATURATION: 99 % | HEART RATE: 60 BPM | RESPIRATION RATE: 18 BRPM

## 2021-01-20 DIAGNOSIS — I82.91 CHRONIC EMBOLISM AND THROMBOSIS OF UNSPECIFIED VEIN: ICD-10-CM

## 2021-01-20 DIAGNOSIS — Z79.01 LONG TERM (CURRENT) USE OF ANTICOAGULANTS: ICD-10-CM

## 2021-01-20 LAB
INR PPP: 3.1 RATIO
POCT-PROTHROMBIN TIME: 37.7 SECS
QUALITY CONTROL: YES

## 2021-01-26 ENCOUNTER — APPOINTMENT (OUTPATIENT)
Dept: MEDICATION MANAGEMENT | Facility: CLINIC | Age: 81
End: 2021-01-26

## 2021-01-26 ENCOUNTER — OUTPATIENT (OUTPATIENT)
Dept: OUTPATIENT SERVICES | Facility: HOSPITAL | Age: 81
LOS: 1 days | Discharge: HOME | End: 2021-01-26

## 2021-01-26 VITALS — HEART RATE: 59 BPM | RESPIRATION RATE: 16 BRPM | OXYGEN SATURATION: 98 %

## 2021-01-26 DIAGNOSIS — I82.91 CHRONIC EMBOLISM AND THROMBOSIS OF UNSPECIFIED VEIN: ICD-10-CM

## 2021-01-26 DIAGNOSIS — Z79.01 LONG TERM (CURRENT) USE OF ANTICOAGULANTS: ICD-10-CM

## 2021-01-26 LAB
INR PPP: 3 RATIO
POCT-PROTHROMBIN TIME: 36.4 SECS
QUALITY CONTROL: YES

## 2021-01-26 RX ORDER — ENOXAPARIN SODIUM 100 MG/ML
100 INJECTION SUBCUTANEOUS
Qty: 10 | Refills: 1 | Status: ACTIVE | COMMUNITY
Start: 2021-01-26 | End: 1900-01-01

## 2021-02-01 ENCOUNTER — APPOINTMENT (OUTPATIENT)
Dept: MEDICATION MANAGEMENT | Facility: CLINIC | Age: 81
End: 2021-02-01

## 2021-02-04 ENCOUNTER — OUTPATIENT (OUTPATIENT)
Dept: OUTPATIENT SERVICES | Facility: HOSPITAL | Age: 81
LOS: 1 days | Discharge: HOME | End: 2021-02-04

## 2021-02-04 ENCOUNTER — APPOINTMENT (OUTPATIENT)
Dept: MEDICATION MANAGEMENT | Facility: CLINIC | Age: 81
End: 2021-02-04

## 2021-02-04 VITALS — RESPIRATION RATE: 16 BRPM | OXYGEN SATURATION: 98 % | HEART RATE: 61 BPM

## 2021-02-04 DIAGNOSIS — Z79.01 LONG TERM (CURRENT) USE OF ANTICOAGULANTS: ICD-10-CM

## 2021-02-04 DIAGNOSIS — I82.91 CHRONIC EMBOLISM AND THROMBOSIS OF UNSPECIFIED VEIN: ICD-10-CM

## 2021-02-04 LAB
INR PPP: 1.9 RATIO
POCT-PROTHROMBIN TIME: 23.1 SECS
QUALITY CONTROL: YES

## 2021-02-08 ENCOUNTER — APPOINTMENT (OUTPATIENT)
Dept: MEDICATION MANAGEMENT | Facility: CLINIC | Age: 81
End: 2021-02-08

## 2021-02-08 VITALS — HEART RATE: 60 BPM | OXYGEN SATURATION: 98 % | RESPIRATION RATE: 16 BRPM

## 2021-02-08 LAB
INR PPP: 1.2 RATIO
POCT-PROTHROMBIN TIME: 14.7 SECS
QUALITY CONTROL: YES

## 2021-02-12 ENCOUNTER — OUTPATIENT (OUTPATIENT)
Dept: OUTPATIENT SERVICES | Facility: HOSPITAL | Age: 81
LOS: 1 days | Discharge: HOME | End: 2021-02-12

## 2021-02-12 ENCOUNTER — APPOINTMENT (OUTPATIENT)
Dept: MEDICATION MANAGEMENT | Facility: CLINIC | Age: 81
End: 2021-02-12

## 2021-02-12 VITALS — RESPIRATION RATE: 16 BRPM | HEART RATE: 68 BPM | OXYGEN SATURATION: 98 %

## 2021-02-12 DIAGNOSIS — Z79.01 LONG TERM (CURRENT) USE OF ANTICOAGULANTS: ICD-10-CM

## 2021-02-12 DIAGNOSIS — I82.91 CHRONIC EMBOLISM AND THROMBOSIS OF UNSPECIFIED VEIN: ICD-10-CM

## 2021-02-12 LAB
INR PPP: 1.7 RATIO
POCT-PROTHROMBIN TIME: 20.5 SECS
QUALITY CONTROL: YES

## 2021-02-23 ENCOUNTER — APPOINTMENT (OUTPATIENT)
Dept: MEDICATION MANAGEMENT | Facility: CLINIC | Age: 81
End: 2021-02-23

## 2021-02-23 ENCOUNTER — OUTPATIENT (OUTPATIENT)
Dept: OUTPATIENT SERVICES | Facility: HOSPITAL | Age: 81
LOS: 1 days | Discharge: HOME | End: 2021-02-23

## 2021-02-23 VITALS — RESPIRATION RATE: 16 BRPM | OXYGEN SATURATION: 98 % | HEART RATE: 60 BPM

## 2021-02-23 DIAGNOSIS — Z79.01 LONG TERM (CURRENT) USE OF ANTICOAGULANTS: ICD-10-CM

## 2021-02-23 DIAGNOSIS — I82.91 CHRONIC EMBOLISM AND THROMBOSIS OF UNSPECIFIED VEIN: ICD-10-CM

## 2021-02-23 LAB
INR PPP: 3 RATIO
POCT-PROTHROMBIN TIME: 35.5 SECS
QUALITY CONTROL: YES

## 2021-03-09 ENCOUNTER — APPOINTMENT (OUTPATIENT)
Dept: MEDICATION MANAGEMENT | Facility: CLINIC | Age: 81
End: 2021-03-09

## 2021-03-09 ENCOUNTER — OUTPATIENT (OUTPATIENT)
Dept: OUTPATIENT SERVICES | Facility: HOSPITAL | Age: 81
LOS: 1 days | Discharge: HOME | End: 2021-03-09

## 2021-03-09 VITALS — OXYGEN SATURATION: 98 % | RESPIRATION RATE: 16 BRPM | HEART RATE: 63 BPM

## 2021-03-09 DIAGNOSIS — Z79.01 LONG TERM (CURRENT) USE OF ANTICOAGULANTS: ICD-10-CM

## 2021-03-09 DIAGNOSIS — I82.91 CHRONIC EMBOLISM AND THROMBOSIS OF UNSPECIFIED VEIN: ICD-10-CM

## 2021-03-09 LAB
INR PPP: 2.1 RATIO
POCT-PROTHROMBIN TIME: 25 SECS
QUALITY CONTROL: YES

## 2021-03-30 ENCOUNTER — OUTPATIENT (OUTPATIENT)
Dept: OUTPATIENT SERVICES | Facility: HOSPITAL | Age: 81
LOS: 1 days | Discharge: HOME | End: 2021-03-30

## 2021-03-30 ENCOUNTER — APPOINTMENT (OUTPATIENT)
Dept: MEDICATION MANAGEMENT | Facility: CLINIC | Age: 81
End: 2021-03-30

## 2021-03-30 VITALS — RESPIRATION RATE: 16 BRPM | OXYGEN SATURATION: 98 % | HEART RATE: 63 BPM

## 2021-03-30 DIAGNOSIS — Z79.01 LONG TERM (CURRENT) USE OF ANTICOAGULANTS: ICD-10-CM

## 2021-03-30 DIAGNOSIS — I82.91 CHRONIC EMBOLISM AND THROMBOSIS OF UNSPECIFIED VEIN: ICD-10-CM

## 2021-03-30 LAB
INR PPP: 1.9 RATIO
POCT-PROTHROMBIN TIME: 23 SECS
QUALITY CONTROL: YES

## 2021-04-20 ENCOUNTER — APPOINTMENT (OUTPATIENT)
Dept: MEDICATION MANAGEMENT | Facility: CLINIC | Age: 81
End: 2021-04-20

## 2021-04-20 ENCOUNTER — OUTPATIENT (OUTPATIENT)
Dept: OUTPATIENT SERVICES | Facility: HOSPITAL | Age: 81
LOS: 1 days | Discharge: HOME | End: 2021-04-20

## 2021-04-20 VITALS — HEART RATE: 64 BPM | RESPIRATION RATE: 16 BRPM | OXYGEN SATURATION: 96 %

## 2021-04-20 DIAGNOSIS — I82.91 CHRONIC EMBOLISM AND THROMBOSIS OF UNSPECIFIED VEIN: ICD-10-CM

## 2021-04-20 DIAGNOSIS — Z79.01 LONG TERM (CURRENT) USE OF ANTICOAGULANTS: ICD-10-CM

## 2021-04-20 LAB
INR PPP: 2.4 RATIO
POCT-PROTHROMBIN TIME: 28.9 SECS
QUALITY CONTROL: YES

## 2021-05-18 ENCOUNTER — OUTPATIENT (OUTPATIENT)
Dept: OUTPATIENT SERVICES | Facility: HOSPITAL | Age: 81
LOS: 1 days | Discharge: HOME | End: 2021-05-18

## 2021-05-18 ENCOUNTER — APPOINTMENT (OUTPATIENT)
Dept: MEDICATION MANAGEMENT | Facility: CLINIC | Age: 81
End: 2021-05-18

## 2021-05-18 VITALS — OXYGEN SATURATION: 97 % | RESPIRATION RATE: 16 BRPM | HEART RATE: 59 BPM

## 2021-05-18 DIAGNOSIS — I82.91 CHRONIC EMBOLISM AND THROMBOSIS OF UNSPECIFIED VEIN: ICD-10-CM

## 2021-05-18 DIAGNOSIS — Z79.01 LONG TERM (CURRENT) USE OF ANTICOAGULANTS: ICD-10-CM

## 2021-05-18 LAB
INR PPP: 2.5 RATIO
POCT-PROTHROMBIN TIME: 29.6 SECS
QUALITY CONTROL: YES

## 2021-06-22 ENCOUNTER — APPOINTMENT (OUTPATIENT)
Dept: MEDICATION MANAGEMENT | Facility: CLINIC | Age: 81
End: 2021-06-22

## 2021-06-22 ENCOUNTER — OUTPATIENT (OUTPATIENT)
Dept: OUTPATIENT SERVICES | Facility: HOSPITAL | Age: 81
LOS: 1 days | Discharge: HOME | End: 2021-06-22

## 2021-06-22 VITALS — OXYGEN SATURATION: 96 % | HEART RATE: 60 BPM | RESPIRATION RATE: 16 BRPM

## 2021-06-22 DIAGNOSIS — I82.91 CHRONIC EMBOLISM AND THROMBOSIS OF UNSPECIFIED VEIN: ICD-10-CM

## 2021-06-22 DIAGNOSIS — Z79.01 LONG TERM (CURRENT) USE OF ANTICOAGULANTS: ICD-10-CM

## 2021-06-22 LAB
INR PPP: 1.8 RATIO
POCT-PROTHROMBIN TIME: 22.1 SECS
QUALITY CONTROL: YES

## 2021-07-27 ENCOUNTER — OUTPATIENT (OUTPATIENT)
Dept: OUTPATIENT SERVICES | Facility: HOSPITAL | Age: 81
LOS: 1 days | Discharge: HOME | End: 2021-07-27

## 2021-07-27 ENCOUNTER — APPOINTMENT (OUTPATIENT)
Dept: MEDICATION MANAGEMENT | Facility: CLINIC | Age: 81
End: 2021-07-27

## 2021-07-27 VITALS — HEART RATE: 63 BPM | OXYGEN SATURATION: 98 % | RESPIRATION RATE: 16 BRPM

## 2021-07-27 DIAGNOSIS — Z79.01 LONG TERM (CURRENT) USE OF ANTICOAGULANTS: ICD-10-CM

## 2021-07-27 DIAGNOSIS — I82.91 CHRONIC EMBOLISM AND THROMBOSIS OF UNSPECIFIED VEIN: ICD-10-CM

## 2021-07-27 LAB
INR PPP: 2.7 RATIO
POCT-PROTHROMBIN TIME: 32 SECS
QUALITY CONTROL: YES

## 2021-08-31 ENCOUNTER — APPOINTMENT (OUTPATIENT)
Dept: MEDICATION MANAGEMENT | Facility: CLINIC | Age: 81
End: 2021-08-31

## 2021-08-31 ENCOUNTER — OUTPATIENT (OUTPATIENT)
Dept: OUTPATIENT SERVICES | Facility: HOSPITAL | Age: 81
LOS: 1 days | Discharge: HOME | End: 2021-08-31

## 2021-08-31 VITALS — HEART RATE: 58 BPM | RESPIRATION RATE: 16 BRPM | OXYGEN SATURATION: 98 %

## 2021-08-31 DIAGNOSIS — Z79.01 LONG TERM (CURRENT) USE OF ANTICOAGULANTS: ICD-10-CM

## 2021-08-31 DIAGNOSIS — I82.91 CHRONIC EMBOLISM AND THROMBOSIS OF UNSPECIFIED VEIN: ICD-10-CM

## 2021-08-31 LAB
INR PPP: 2.7 RATIO
POCT-PROTHROMBIN TIME: 32.2 SECS
QUALITY CONTROL: YES

## 2021-10-05 ENCOUNTER — APPOINTMENT (OUTPATIENT)
Dept: MEDICATION MANAGEMENT | Facility: CLINIC | Age: 81
End: 2021-10-05

## 2021-10-05 ENCOUNTER — OUTPATIENT (OUTPATIENT)
Dept: OUTPATIENT SERVICES | Facility: HOSPITAL | Age: 81
LOS: 1 days | Discharge: HOME | End: 2021-10-05

## 2021-10-05 VITALS — RESPIRATION RATE: 16 BRPM | HEART RATE: 64 BPM | OXYGEN SATURATION: 98 %

## 2021-10-05 DIAGNOSIS — Z79.01 LONG TERM (CURRENT) USE OF ANTICOAGULANTS: ICD-10-CM

## 2021-10-05 DIAGNOSIS — I82.91 CHRONIC EMBOLISM AND THROMBOSIS OF UNSPECIFIED VEIN: ICD-10-CM

## 2021-10-05 LAB
INR PPP: 2.9 RATIO
POCT-PROTHROMBIN TIME: 34.5 SECS
QUALITY CONTROL: YES

## 2021-11-09 ENCOUNTER — APPOINTMENT (OUTPATIENT)
Dept: MEDICATION MANAGEMENT | Facility: CLINIC | Age: 81
End: 2021-11-09

## 2021-11-09 ENCOUNTER — OUTPATIENT (OUTPATIENT)
Dept: OUTPATIENT SERVICES | Facility: HOSPITAL | Age: 81
LOS: 1 days | Discharge: HOME | End: 2021-11-09

## 2021-11-09 VITALS — HEART RATE: 54 BPM | RESPIRATION RATE: 16 BRPM | OXYGEN SATURATION: 97 %

## 2021-11-09 DIAGNOSIS — Z79.01 LONG TERM (CURRENT) USE OF ANTICOAGULANTS: ICD-10-CM

## 2021-11-09 DIAGNOSIS — I82.91 CHRONIC EMBOLISM AND THROMBOSIS OF UNSPECIFIED VEIN: ICD-10-CM

## 2021-11-09 LAB
INR PPP: 3.8 RATIO
POCT-PROTHROMBIN TIME: 45.5 SECS
QUALITY CONTROL: YES

## 2021-11-29 ENCOUNTER — OUTPATIENT (OUTPATIENT)
Dept: OUTPATIENT SERVICES | Facility: HOSPITAL | Age: 81
LOS: 1 days | Discharge: HOME | End: 2021-11-29

## 2021-11-29 ENCOUNTER — APPOINTMENT (OUTPATIENT)
Dept: MEDICATION MANAGEMENT | Facility: CLINIC | Age: 81
End: 2021-11-29

## 2021-11-29 VITALS — RESPIRATION RATE: 16 BRPM | OXYGEN SATURATION: 95 % | HEART RATE: 59 BPM

## 2021-11-29 DIAGNOSIS — Z79.01 LONG TERM (CURRENT) USE OF ANTICOAGULANTS: ICD-10-CM

## 2021-11-29 DIAGNOSIS — I82.91 CHRONIC EMBOLISM AND THROMBOSIS OF UNSPECIFIED VEIN: ICD-10-CM

## 2021-11-29 LAB
INR PPP: 2.5 RATIO
POCT-PROTHROMBIN TIME: 30.4 SECS
QUALITY CONTROL: YES

## 2022-01-03 ENCOUNTER — APPOINTMENT (OUTPATIENT)
Dept: MEDICATION MANAGEMENT | Facility: CLINIC | Age: 82
End: 2022-01-03

## 2022-01-03 ENCOUNTER — OUTPATIENT (OUTPATIENT)
Dept: OUTPATIENT SERVICES | Facility: HOSPITAL | Age: 82
LOS: 1 days | Discharge: HOME | End: 2022-01-03

## 2022-01-03 VITALS — HEART RATE: 56 BPM | RESPIRATION RATE: 16 BRPM | OXYGEN SATURATION: 98 %

## 2022-01-03 DIAGNOSIS — I82.91 CHRONIC EMBOLISM AND THROMBOSIS OF UNSPECIFIED VEIN: ICD-10-CM

## 2022-01-03 DIAGNOSIS — Z79.01 LONG TERM (CURRENT) USE OF ANTICOAGULANTS: ICD-10-CM

## 2022-01-03 LAB
INR PPP: 2.1 RATIO
POCT-PROTHROMBIN TIME: 25.7 SECS
QUALITY CONTROL: YES

## 2022-02-07 ENCOUNTER — APPOINTMENT (OUTPATIENT)
Dept: MEDICATION MANAGEMENT | Facility: CLINIC | Age: 82
End: 2022-02-07

## 2022-02-07 ENCOUNTER — OUTPATIENT (OUTPATIENT)
Dept: OUTPATIENT SERVICES | Facility: HOSPITAL | Age: 82
LOS: 1 days | Discharge: HOME | End: 2022-02-07

## 2022-02-07 VITALS — RESPIRATION RATE: 16 BRPM | HEART RATE: 60 BPM | OXYGEN SATURATION: 98 %

## 2022-02-07 DIAGNOSIS — I82.91 CHRONIC EMBOLISM AND THROMBOSIS OF UNSPECIFIED VEIN: ICD-10-CM

## 2022-02-07 DIAGNOSIS — Z79.01 LONG TERM (CURRENT) USE OF ANTICOAGULANTS: ICD-10-CM

## 2022-02-07 LAB
INR PPP: 2.4 RATIO
POCT-PROTHROMBIN TIME: 28.4 SECS
QUALITY CONTROL: YES

## 2022-03-15 ENCOUNTER — OUTPATIENT (OUTPATIENT)
Dept: OUTPATIENT SERVICES | Facility: HOSPITAL | Age: 82
LOS: 1 days | Discharge: HOME | End: 2022-03-15

## 2022-03-15 ENCOUNTER — APPOINTMENT (OUTPATIENT)
Dept: MEDICATION MANAGEMENT | Facility: CLINIC | Age: 82
End: 2022-03-15

## 2022-03-15 VITALS — RESPIRATION RATE: 16 BRPM | HEART RATE: 58 BPM | OXYGEN SATURATION: 98 %

## 2022-03-15 DIAGNOSIS — Z79.01 LONG TERM (CURRENT) USE OF ANTICOAGULANTS: ICD-10-CM

## 2022-03-15 DIAGNOSIS — I82.91 CHRONIC EMBOLISM AND THROMBOSIS OF UNSPECIFIED VEIN: ICD-10-CM

## 2022-03-15 LAB
INR PPP: 1.9 RATIO
POCT-PROTHROMBIN TIME: 22.4 SECS
QUALITY CONTROL: YES

## 2022-03-29 ENCOUNTER — RX RENEWAL (OUTPATIENT)
Age: 82
End: 2022-03-29

## 2022-04-12 ENCOUNTER — OUTPATIENT (OUTPATIENT)
Dept: OUTPATIENT SERVICES | Facility: HOSPITAL | Age: 82
LOS: 1 days | Discharge: HOME | End: 2022-04-12

## 2022-04-12 ENCOUNTER — APPOINTMENT (OUTPATIENT)
Dept: MEDICATION MANAGEMENT | Facility: CLINIC | Age: 82
End: 2022-04-12

## 2022-04-12 DIAGNOSIS — Z79.01 LONG TERM (CURRENT) USE OF ANTICOAGULANTS: ICD-10-CM

## 2022-04-12 DIAGNOSIS — I82.91 CHRONIC EMBOLISM AND THROMBOSIS OF UNSPECIFIED VEIN: ICD-10-CM

## 2022-04-12 LAB
INR PPP: 2.1 RATIO
POCT-PROTHROMBIN TIME: 24.7 SECS
QUALITY CONTROL: YES

## 2022-05-17 ENCOUNTER — APPOINTMENT (OUTPATIENT)
Dept: MEDICATION MANAGEMENT | Facility: CLINIC | Age: 82
End: 2022-05-17

## 2022-05-17 ENCOUNTER — OUTPATIENT (OUTPATIENT)
Dept: OUTPATIENT SERVICES | Facility: HOSPITAL | Age: 82
LOS: 1 days | Discharge: HOME | End: 2022-05-17

## 2022-05-17 DIAGNOSIS — I82.91 CHRONIC EMBOLISM AND THROMBOSIS OF UNSPECIFIED VEIN: ICD-10-CM

## 2022-05-17 DIAGNOSIS — Z79.01 LONG TERM (CURRENT) USE OF ANTICOAGULANTS: ICD-10-CM

## 2022-05-17 LAB
INR PPP: 1.6 RATIO
POCT-PROTHROMBIN TIME: 19 SECS
QUALITY CONTROL: YES

## 2022-06-14 ENCOUNTER — OUTPATIENT (OUTPATIENT)
Dept: OUTPATIENT SERVICES | Facility: HOSPITAL | Age: 82
LOS: 1 days | Discharge: HOME | End: 2022-06-14

## 2022-06-14 ENCOUNTER — APPOINTMENT (OUTPATIENT)
Dept: MEDICATION MANAGEMENT | Facility: CLINIC | Age: 82
End: 2022-06-14

## 2022-06-14 DIAGNOSIS — Z79.01 LONG TERM (CURRENT) USE OF ANTICOAGULANTS: ICD-10-CM

## 2022-06-14 DIAGNOSIS — I82.91 CHRONIC EMBOLISM AND THROMBOSIS OF UNSPECIFIED VEIN: ICD-10-CM

## 2022-06-14 LAB
INR PPP: 3.3 RATIO
POCT-PROTHROMBIN TIME: 39.2 SECS
QUALITY CONTROL: YES

## 2022-07-05 ENCOUNTER — APPOINTMENT (OUTPATIENT)
Dept: MEDICATION MANAGEMENT | Facility: CLINIC | Age: 82
End: 2022-07-05

## 2022-07-05 ENCOUNTER — OUTPATIENT (OUTPATIENT)
Dept: OUTPATIENT SERVICES | Facility: HOSPITAL | Age: 82
LOS: 1 days | Discharge: HOME | End: 2022-07-05

## 2022-07-05 DIAGNOSIS — Z79.01 LONG TERM (CURRENT) USE OF ANTICOAGULANTS: ICD-10-CM

## 2022-07-05 DIAGNOSIS — I82.91 CHRONIC EMBOLISM AND THROMBOSIS OF UNSPECIFIED VEIN: ICD-10-CM

## 2022-07-05 LAB
INR PPP: 3.1 RATIO
POCT-PROTHROMBIN TIME: 37.1 SECS
QUALITY CONTROL: YES

## 2022-07-12 ENCOUNTER — APPOINTMENT (OUTPATIENT)
Dept: MEDICATION MANAGEMENT | Facility: CLINIC | Age: 82
End: 2022-07-12

## 2022-07-12 ENCOUNTER — OUTPATIENT (OUTPATIENT)
Dept: OUTPATIENT SERVICES | Facility: HOSPITAL | Age: 82
LOS: 1 days | Discharge: HOME | End: 2022-07-12

## 2022-07-12 DIAGNOSIS — I82.91 CHRONIC EMBOLISM AND THROMBOSIS OF UNSPECIFIED VEIN: ICD-10-CM

## 2022-07-12 DIAGNOSIS — Z79.01 LONG TERM (CURRENT) USE OF ANTICOAGULANTS: ICD-10-CM

## 2022-07-12 LAB
INR PPP: 3.9 RATIO
POCT-PROTHROMBIN TIME: 46.9 SECS
QUALITY CONTROL: YES

## 2022-07-18 ENCOUNTER — APPOINTMENT (OUTPATIENT)
Dept: MEDICATION MANAGEMENT | Facility: CLINIC | Age: 82
End: 2022-07-18

## 2022-07-18 ENCOUNTER — OUTPATIENT (OUTPATIENT)
Dept: OUTPATIENT SERVICES | Facility: HOSPITAL | Age: 82
LOS: 1 days | Discharge: HOME | End: 2022-07-18

## 2022-07-18 DIAGNOSIS — Z79.01 LONG TERM (CURRENT) USE OF ANTICOAGULANTS: ICD-10-CM

## 2022-07-18 DIAGNOSIS — I82.91 CHRONIC EMBOLISM AND THROMBOSIS OF UNSPECIFIED VEIN: ICD-10-CM

## 2022-07-18 LAB
INR PPP: 2.1 RATIO
POCT-PROTHROMBIN TIME: 25.2 SECS
QUALITY CONTROL: YES

## 2022-07-30 ENCOUNTER — OUTPATIENT (OUTPATIENT)
Dept: OUTPATIENT SERVICES | Facility: HOSPITAL | Age: 82
LOS: 1 days | Discharge: HOME | End: 2022-07-30

## 2022-07-30 DIAGNOSIS — R05.9 COUGH, UNSPECIFIED: ICD-10-CM

## 2022-07-30 PROCEDURE — 71250 CT THORAX DX C-: CPT | Mod: 26,MH

## 2022-08-02 ENCOUNTER — APPOINTMENT (OUTPATIENT)
Dept: MEDICATION MANAGEMENT | Facility: CLINIC | Age: 82
End: 2022-08-02

## 2022-08-16 ENCOUNTER — OUTPATIENT (OUTPATIENT)
Dept: OUTPATIENT SERVICES | Facility: HOSPITAL | Age: 82
LOS: 1 days | Discharge: HOME | End: 2022-08-16

## 2022-08-16 ENCOUNTER — APPOINTMENT (OUTPATIENT)
Dept: MEDICATION MANAGEMENT | Facility: CLINIC | Age: 82
End: 2022-08-16

## 2022-08-16 LAB
INR PPP: 2.5 RATIO
POCT-PROTHROMBIN TIME: 29.7 SECS
QUALITY CONTROL: YES

## 2022-08-29 ENCOUNTER — RX RENEWAL (OUTPATIENT)
Age: 82
End: 2022-08-29

## 2022-09-20 ENCOUNTER — APPOINTMENT (OUTPATIENT)
Dept: MEDICATION MANAGEMENT | Facility: CLINIC | Age: 82
End: 2022-09-20

## 2022-09-20 ENCOUNTER — OUTPATIENT (OUTPATIENT)
Dept: OUTPATIENT SERVICES | Facility: HOSPITAL | Age: 82
LOS: 1 days | Discharge: HOME | End: 2022-09-20

## 2022-09-20 DIAGNOSIS — I82.91 CHRONIC EMBOLISM AND THROMBOSIS OF UNSPECIFIED VEIN: ICD-10-CM

## 2022-09-20 DIAGNOSIS — Z79.01 LONG TERM (CURRENT) USE OF ANTICOAGULANTS: ICD-10-CM

## 2022-09-20 LAB
INR PPP: 2.3 RATIO
POCT-PROTHROMBIN TIME: 27.6 SECS
QUALITY CONTROL: YES

## 2022-11-01 ENCOUNTER — OUTPATIENT (OUTPATIENT)
Dept: OUTPATIENT SERVICES | Facility: HOSPITAL | Age: 82
LOS: 1 days | Discharge: HOME | End: 2022-11-01

## 2022-11-01 ENCOUNTER — APPOINTMENT (OUTPATIENT)
Dept: MEDICATION MANAGEMENT | Facility: CLINIC | Age: 82
End: 2022-11-01

## 2022-11-01 DIAGNOSIS — Z79.01 LONG TERM (CURRENT) USE OF ANTICOAGULANTS: ICD-10-CM

## 2022-11-01 DIAGNOSIS — I82.91 CHRONIC EMBOLISM AND THROMBOSIS OF UNSPECIFIED VEIN: ICD-10-CM

## 2022-11-01 LAB
INR PPP: 2.4 RATIO
POCT-PROTHROMBIN TIME: 28.4 SECS
QUALITY CONTROL: YES

## 2022-12-13 ENCOUNTER — APPOINTMENT (OUTPATIENT)
Dept: MEDICATION MANAGEMENT | Facility: CLINIC | Age: 82
End: 2022-12-13

## 2022-12-13 ENCOUNTER — OUTPATIENT (OUTPATIENT)
Dept: OUTPATIENT SERVICES | Facility: HOSPITAL | Age: 82
LOS: 1 days | Discharge: HOME | End: 2022-12-13

## 2022-12-13 DIAGNOSIS — Z79.01 LONG TERM (CURRENT) USE OF ANTICOAGULANTS: ICD-10-CM

## 2022-12-13 DIAGNOSIS — I82.91 CHRONIC EMBOLISM AND THROMBOSIS OF UNSPECIFIED VEIN: ICD-10-CM

## 2022-12-13 LAB
INR PPP: 2.3 RATIO
POCT-PROTHROMBIN TIME: 27.5 SECS
QUALITY CONTROL: YES

## 2023-01-24 ENCOUNTER — APPOINTMENT (OUTPATIENT)
Dept: MEDICATION MANAGEMENT | Facility: CLINIC | Age: 83
End: 2023-01-24

## 2023-01-24 ENCOUNTER — OUTPATIENT (OUTPATIENT)
Dept: OUTPATIENT SERVICES | Facility: HOSPITAL | Age: 83
LOS: 1 days | Discharge: HOME | End: 2023-01-24

## 2023-01-24 DIAGNOSIS — I82.91 CHRONIC EMBOLISM AND THROMBOSIS OF UNSPECIFIED VEIN: ICD-10-CM

## 2023-01-24 DIAGNOSIS — Z79.01 LONG TERM (CURRENT) USE OF ANTICOAGULANTS: ICD-10-CM

## 2023-01-24 LAB
INR PPP: 2.9 RATIO
POCT-PROTHROMBIN TIME: 34.9 SECS
QUALITY CONTROL: YES

## 2023-03-07 ENCOUNTER — OUTPATIENT (OUTPATIENT)
Dept: OUTPATIENT SERVICES | Facility: HOSPITAL | Age: 83
LOS: 1 days | End: 2023-03-07
Payer: MEDICARE

## 2023-03-07 ENCOUNTER — APPOINTMENT (OUTPATIENT)
Dept: MEDICATION MANAGEMENT | Facility: CLINIC | Age: 83
End: 2023-03-07

## 2023-03-07 DIAGNOSIS — Z79.01 LONG TERM (CURRENT) USE OF ANTICOAGULANTS: ICD-10-CM

## 2023-03-07 DIAGNOSIS — I82.91 CHRONIC EMBOLISM AND THROMBOSIS OF UNSPECIFIED VEIN: ICD-10-CM

## 2023-03-07 LAB
INR PPP: 2.7 RATIO
POCT-PROTHROMBIN TIME: 32.8 SECS
QUALITY CONTROL: YES

## 2023-03-07 PROCEDURE — 85610 PROTHROMBIN TIME: CPT

## 2023-03-07 PROCEDURE — 99211 OFF/OP EST MAY X REQ PHY/QHP: CPT

## 2023-03-07 PROCEDURE — 36416 COLLJ CAPILLARY BLOOD SPEC: CPT

## 2023-03-08 DIAGNOSIS — Z79.01 LONG TERM (CURRENT) USE OF ANTICOAGULANTS: ICD-10-CM

## 2023-03-08 DIAGNOSIS — I82.91 CHRONIC EMBOLISM AND THROMBOSIS OF UNSPECIFIED VEIN: ICD-10-CM

## 2023-03-09 ENCOUNTER — RX RENEWAL (OUTPATIENT)
Age: 83
End: 2023-03-09

## 2023-04-18 ENCOUNTER — OUTPATIENT (OUTPATIENT)
Dept: OUTPATIENT SERVICES | Facility: HOSPITAL | Age: 83
LOS: 1 days | End: 2023-04-18
Payer: MEDICARE

## 2023-04-18 ENCOUNTER — APPOINTMENT (OUTPATIENT)
Dept: MEDICATION MANAGEMENT | Facility: CLINIC | Age: 83
End: 2023-04-18

## 2023-04-18 DIAGNOSIS — I82.91 CHRONIC EMBOLISM AND THROMBOSIS OF UNSPECIFIED VEIN: ICD-10-CM

## 2023-04-18 DIAGNOSIS — Z79.01 LONG TERM (CURRENT) USE OF ANTICOAGULANTS: ICD-10-CM

## 2023-04-18 LAB
INR PPP: 2.2 RATIO
POCT-PROTHROMBIN TIME: 26.1 SECS
QUALITY CONTROL: YES

## 2023-04-18 PROCEDURE — 36416 COLLJ CAPILLARY BLOOD SPEC: CPT

## 2023-04-18 PROCEDURE — 99211 OFF/OP EST MAY X REQ PHY/QHP: CPT

## 2023-04-18 PROCEDURE — 85610 PROTHROMBIN TIME: CPT

## 2023-04-19 DIAGNOSIS — I82.91 CHRONIC EMBOLISM AND THROMBOSIS OF UNSPECIFIED VEIN: ICD-10-CM

## 2023-04-19 DIAGNOSIS — Z79.01 LONG TERM (CURRENT) USE OF ANTICOAGULANTS: ICD-10-CM

## 2023-05-30 ENCOUNTER — OUTPATIENT (OUTPATIENT)
Dept: OUTPATIENT SERVICES | Facility: HOSPITAL | Age: 83
LOS: 1 days | End: 2023-05-30
Payer: MEDICARE

## 2023-05-30 ENCOUNTER — APPOINTMENT (OUTPATIENT)
Dept: MEDICATION MANAGEMENT | Facility: CLINIC | Age: 83
End: 2023-05-30

## 2023-05-30 DIAGNOSIS — Z79.01 LONG TERM (CURRENT) USE OF ANTICOAGULANTS: ICD-10-CM

## 2023-05-30 DIAGNOSIS — I82.91 CHRONIC EMBOLISM AND THROMBOSIS OF UNSPECIFIED VEIN: ICD-10-CM

## 2023-05-30 LAB
INR PPP: 3.1 RATIO
POCT-PROTHROMBIN TIME: 37.4 SECS
QUALITY CONTROL: YES

## 2023-05-30 PROCEDURE — 99211 OFF/OP EST MAY X REQ PHY/QHP: CPT

## 2023-05-30 PROCEDURE — 36416 COLLJ CAPILLARY BLOOD SPEC: CPT

## 2023-05-30 PROCEDURE — 85610 PROTHROMBIN TIME: CPT

## 2023-05-31 DIAGNOSIS — Z79.01 LONG TERM (CURRENT) USE OF ANTICOAGULANTS: ICD-10-CM

## 2023-05-31 DIAGNOSIS — I82.91 CHRONIC EMBOLISM AND THROMBOSIS OF UNSPECIFIED VEIN: ICD-10-CM

## 2023-06-27 ENCOUNTER — APPOINTMENT (OUTPATIENT)
Dept: MEDICATION MANAGEMENT | Facility: CLINIC | Age: 83
End: 2023-06-27

## 2023-06-27 ENCOUNTER — OUTPATIENT (OUTPATIENT)
Dept: OUTPATIENT SERVICES | Facility: HOSPITAL | Age: 83
LOS: 1 days | End: 2023-06-27
Payer: MEDICARE

## 2023-06-27 DIAGNOSIS — I82.91 CHRONIC EMBOLISM AND THROMBOSIS OF UNSPECIFIED VEIN: ICD-10-CM

## 2023-06-27 DIAGNOSIS — Z79.01 LONG TERM (CURRENT) USE OF ANTICOAGULANTS: ICD-10-CM

## 2023-06-27 LAB
INR PPP: 3 RATIO
POCT-PROTHROMBIN TIME: 35.5 SECS
QUALITY CONTROL: YES

## 2023-06-27 PROCEDURE — 99211 OFF/OP EST MAY X REQ PHY/QHP: CPT

## 2023-06-27 PROCEDURE — 36416 COLLJ CAPILLARY BLOOD SPEC: CPT

## 2023-06-27 PROCEDURE — 85610 PROTHROMBIN TIME: CPT

## 2023-06-28 DIAGNOSIS — I82.91 CHRONIC EMBOLISM AND THROMBOSIS OF UNSPECIFIED VEIN: ICD-10-CM

## 2023-06-28 DIAGNOSIS — Z79.01 LONG TERM (CURRENT) USE OF ANTICOAGULANTS: ICD-10-CM

## 2023-08-01 ENCOUNTER — APPOINTMENT (OUTPATIENT)
Dept: MEDICATION MANAGEMENT | Facility: CLINIC | Age: 83
End: 2023-08-01

## 2023-08-01 ENCOUNTER — OUTPATIENT (OUTPATIENT)
Dept: OUTPATIENT SERVICES | Facility: HOSPITAL | Age: 83
LOS: 1 days | End: 2023-08-01
Payer: MEDICARE

## 2023-08-01 DIAGNOSIS — I82.91 CHRONIC EMBOLISM AND THROMBOSIS OF UNSPECIFIED VEIN: ICD-10-CM

## 2023-08-01 DIAGNOSIS — Z79.01 LONG TERM (CURRENT) USE OF ANTICOAGULANTS: ICD-10-CM

## 2023-08-01 LAB
INR PPP: 2.5 RATIO
POCT-PROTHROMBIN TIME: 30.5 SECS
QUALITY CONTROL: YES

## 2023-08-01 PROCEDURE — 85610 PROTHROMBIN TIME: CPT

## 2023-08-01 PROCEDURE — 99211 OFF/OP EST MAY X REQ PHY/QHP: CPT

## 2023-08-01 PROCEDURE — 36416 COLLJ CAPILLARY BLOOD SPEC: CPT

## 2023-08-02 DIAGNOSIS — Z79.01 LONG TERM (CURRENT) USE OF ANTICOAGULANTS: ICD-10-CM

## 2023-08-02 DIAGNOSIS — I82.91 CHRONIC EMBOLISM AND THROMBOSIS OF UNSPECIFIED VEIN: ICD-10-CM

## 2023-09-05 ENCOUNTER — RX RENEWAL (OUTPATIENT)
Age: 83
End: 2023-09-05

## 2023-09-05 ENCOUNTER — OUTPATIENT (OUTPATIENT)
Dept: OUTPATIENT SERVICES | Facility: HOSPITAL | Age: 83
LOS: 1 days | End: 2023-09-05
Payer: MEDICARE

## 2023-09-05 ENCOUNTER — APPOINTMENT (OUTPATIENT)
Dept: MEDICATION MANAGEMENT | Facility: CLINIC | Age: 83
End: 2023-09-05

## 2023-09-05 DIAGNOSIS — Z79.01 LONG TERM (CURRENT) USE OF ANTICOAGULANTS: ICD-10-CM

## 2023-09-05 DIAGNOSIS — I82.91 CHRONIC EMBOLISM AND THROMBOSIS OF UNSPECIFIED VEIN: ICD-10-CM

## 2023-09-05 LAB
INR PPP: 3.6 RATIO
POCT-PROTHROMBIN TIME: 43.3 SECS
QUALITY CONTROL: YES

## 2023-09-05 PROCEDURE — 99211 OFF/OP EST MAY X REQ PHY/QHP: CPT

## 2023-09-05 PROCEDURE — 85610 PROTHROMBIN TIME: CPT

## 2023-09-05 PROCEDURE — 36416 COLLJ CAPILLARY BLOOD SPEC: CPT

## 2023-09-06 DIAGNOSIS — Z79.01 LONG TERM (CURRENT) USE OF ANTICOAGULANTS: ICD-10-CM

## 2023-09-06 DIAGNOSIS — I82.91 CHRONIC EMBOLISM AND THROMBOSIS OF UNSPECIFIED VEIN: ICD-10-CM

## 2023-10-03 ENCOUNTER — OUTPATIENT (OUTPATIENT)
Dept: OUTPATIENT SERVICES | Facility: HOSPITAL | Age: 83
LOS: 1 days | End: 2023-10-03
Payer: MEDICARE

## 2023-10-03 ENCOUNTER — APPOINTMENT (OUTPATIENT)
Dept: MEDICATION MANAGEMENT | Facility: CLINIC | Age: 83
End: 2023-10-03

## 2023-10-03 DIAGNOSIS — Z79.01 LONG TERM (CURRENT) USE OF ANTICOAGULANTS: ICD-10-CM

## 2023-10-03 DIAGNOSIS — I82.91 CHRONIC EMBOLISM AND THROMBOSIS OF UNSPECIFIED VEIN: ICD-10-CM

## 2023-10-03 LAB
INR PPP: 1.5 RATIO
POCT-PROTHROMBIN TIME: 17.5 SECS
QUALITY CONTROL: YES

## 2023-10-03 PROCEDURE — 85610 PROTHROMBIN TIME: CPT

## 2023-10-03 PROCEDURE — 36416 COLLJ CAPILLARY BLOOD SPEC: CPT

## 2023-10-03 PROCEDURE — 99211 OFF/OP EST MAY X REQ PHY/QHP: CPT

## 2023-10-04 DIAGNOSIS — I82.91 CHRONIC EMBOLISM AND THROMBOSIS OF UNSPECIFIED VEIN: ICD-10-CM

## 2023-10-04 DIAGNOSIS — Z79.01 LONG TERM (CURRENT) USE OF ANTICOAGULANTS: ICD-10-CM

## 2023-10-12 ENCOUNTER — OUTPATIENT (OUTPATIENT)
Dept: OUTPATIENT SERVICES | Facility: HOSPITAL | Age: 83
LOS: 1 days | Discharge: HOME | End: 2023-10-12
Payer: MEDICARE

## 2023-10-12 VITALS
DIASTOLIC BLOOD PRESSURE: 68 MMHG | RESPIRATION RATE: 18 BRPM | HEIGHT: 65 IN | SYSTOLIC BLOOD PRESSURE: 126 MMHG | WEIGHT: 235.89 LBS | HEART RATE: 60 BPM | TEMPERATURE: 96 F | OXYGEN SATURATION: 97 %

## 2023-10-12 VITALS — HEART RATE: 838 BPM | SYSTOLIC BLOOD PRESSURE: 146 MMHG | DIASTOLIC BLOOD PRESSURE: 76 MMHG | RESPIRATION RATE: 17 BRPM

## 2023-10-12 DIAGNOSIS — Z98.890 OTHER SPECIFIED POSTPROCEDURAL STATES: Chronic | ICD-10-CM

## 2023-10-12 DIAGNOSIS — H25.11 AGE-RELATED NUCLEAR CATARACT, RIGHT EYE: ICD-10-CM

## 2023-10-12 LAB — GLUCOSE BLDC GLUCOMTR-MCNC: 124 MG/DL — HIGH (ref 70–99)

## 2023-10-12 PROCEDURE — 82962 GLUCOSE BLOOD TEST: CPT

## 2023-10-12 PROCEDURE — V2632: CPT

## 2023-10-12 RX ORDER — WARFARIN SODIUM 2.5 MG/1
1 TABLET ORAL
Refills: 0 | DISCHARGE

## 2023-10-12 RX ORDER — WARFARIN SODIUM 2.5 MG/1
0 TABLET ORAL
Refills: 0 | DISCHARGE

## 2023-10-12 NOTE — ASU PREOP CHECKLIST - INTERNAL PROSTHESES
dental & left knee hardware/yes(specify) sternal wires from cabg, dental & left knee hardware/yes(specify)

## 2023-10-12 NOTE — ASU PATIENT PROFILE, ADULT - FALL HARM RISK - HARM RISK INTERVENTIONS

## 2023-10-12 NOTE — ASU PREOP CHECKLIST - AS TEMP SITE
Subjective:      Rosemary Torres is a 25 year old  female at 38w6d gestation who presents with the complaint of contractions.  Her current pregnancy is significant for fetal renal hydronephrosis and routine prenatal care.  Patient reports contractions since yesterday evening, becoming stronger.       Past Medical History:   Diagnosis Date   • Negative History of CA, HTN, DM, CAD, CVA, DVT, Asthma      Past Surgical History:   Procedure Laterality Date   • Laparoscopic cystectomy       SOCIAL HISTORY:   Social History     Tobacco Use   • Smoking status: Never Smoker   • Smokeless tobacco: Never Used   Substance Use Topics   • Alcohol use: No       Sexually Active: Yes             Partners with: Male       Birth Control/Protection: None      Drug Use:    No              Review of patient's social economics indicates:  No social economics status on file    Family History   Problem Relation Age of Onset   • Asthma Mother    • Cancer, Lung Paternal Grandfather         Objective:      Visit Vitals  /76   Pulse 74   Temp 97.6 °F (36.4 °C) (Oral)   Resp 17   Ht 5' 10\" (1.778 m)   Wt 86 kg   LMP 2019 (Exact Date)   SpO2 97%   BMI 27.22 kg/m²       General:   alert, cooperative and moderate distress   FHT:  140 BPM, moderate variability, intermittent variable decelerations   Presentations: cephalic   Cervix: Exam per Dr. Aguilar    Dilation: 4.5 cm    Effacement: 70%    Station:  -2    Consistency: soft    Position: middle   Extremities: Non-tender, 1+ edema           Assessment:     > IUP at 38w6d  > Entering active labor, SROM with meconium fluid upon moving to exam room  > Right fetal renal pyelectasis,  US planned 3 to 6 weeks after delivery  > GBS negative  > Category 2 FHTs     Plan:     > Admit   > Expectant management    Quality:  VTE Pharmacologic Prophylaxis: No pharmacologic Venous Thromboembolism prophylaxis due to Patient fully ambulating and deemed to be low risk  VTE Mechanical  Prophylaxis: No mechanical VTE prophylaxis due to Patient fully ambulating and deemed to be low risk   oral

## 2023-10-12 NOTE — ASU PATIENT PROFILE, ADULT - NSICDXPASTMEDICALHX_GEN_ALL_CORE_FT
PAST MEDICAL HISTORY:  History of medical problems mi 2004,dm, PE,  carrie (no cpap), right eye cataract, cad, htn, hypercholesteremia, ckd

## 2023-10-16 DIAGNOSIS — H25.89 OTHER AGE-RELATED CATARACT: ICD-10-CM

## 2023-10-16 PROBLEM — Z87.898 PERSONAL HISTORY OF OTHER SPECIFIED CONDITIONS: Chronic | Status: ACTIVE | Noted: 2023-10-12

## 2023-11-02 ENCOUNTER — OUTPATIENT (OUTPATIENT)
Dept: OUTPATIENT SERVICES | Facility: HOSPITAL | Age: 83
LOS: 1 days | Discharge: HOME | End: 2023-11-02
Payer: MEDICARE

## 2023-11-02 VITALS — DIASTOLIC BLOOD PRESSURE: 70 MMHG | SYSTOLIC BLOOD PRESSURE: 160 MMHG | HEART RATE: 55 BPM | RESPIRATION RATE: 15 BRPM

## 2023-11-02 VITALS
HEART RATE: 63 BPM | HEIGHT: 62 IN | OXYGEN SATURATION: 97 % | SYSTOLIC BLOOD PRESSURE: 196 MMHG | DIASTOLIC BLOOD PRESSURE: 87 MMHG | WEIGHT: 274.92 LBS | RESPIRATION RATE: 17 BRPM | TEMPERATURE: 97 F

## 2023-11-02 DIAGNOSIS — H25.12 AGE-RELATED NUCLEAR CATARACT, LEFT EYE: ICD-10-CM

## 2023-11-02 DIAGNOSIS — Z98.890 OTHER SPECIFIED POSTPROCEDURAL STATES: Chronic | ICD-10-CM

## 2023-11-02 LAB
GLUCOSE BLDC GLUCOMTR-MCNC: 106 MG/DL — HIGH (ref 70–99)
GLUCOSE BLDC GLUCOMTR-MCNC: 106 MG/DL — HIGH (ref 70–99)

## 2023-11-02 PROCEDURE — V2632: CPT

## 2023-11-02 PROCEDURE — 82962 GLUCOSE BLOOD TEST: CPT

## 2023-11-02 RX ORDER — METFORMIN HYDROCHLORIDE 850 MG/1
1 TABLET ORAL
Refills: 0 | DISCHARGE

## 2023-11-02 RX ORDER — NATEGLINIDE 60 MG/1
1 TABLET, COATED ORAL
Refills: 0 | DISCHARGE

## 2023-11-02 RX ORDER — CARVEDILOL PHOSPHATE 80 MG/1
1 CAPSULE, EXTENDED RELEASE ORAL
Refills: 0 | DISCHARGE

## 2023-11-02 RX ORDER — ROSUVASTATIN CALCIUM 5 MG/1
1 TABLET ORAL
Refills: 0 | DISCHARGE

## 2023-11-02 RX ORDER — GLYBURIDE 5 MG
2 TABLET ORAL
Refills: 0 | DISCHARGE

## 2023-11-02 RX ORDER — ASPIRIN/CALCIUM CARB/MAGNESIUM 324 MG
1 TABLET ORAL
Refills: 0 | DISCHARGE

## 2023-11-02 RX ORDER — WARFARIN SODIUM 2.5 MG/1
1 TABLET ORAL
Refills: 0 | DISCHARGE

## 2023-11-02 RX ORDER — SITAGLIPTIN 50 MG/1
1 TABLET, FILM COATED ORAL
Refills: 0 | DISCHARGE

## 2023-11-02 NOTE — ASU PATIENT PROFILE, ADULT - FALL HARM RISK - HARM RISK INTERVENTIONS

## 2023-11-07 ENCOUNTER — OUTPATIENT (OUTPATIENT)
Dept: OUTPATIENT SERVICES | Facility: HOSPITAL | Age: 83
LOS: 1 days | End: 2023-11-07
Payer: MEDICARE

## 2023-11-07 ENCOUNTER — APPOINTMENT (OUTPATIENT)
Dept: MEDICATION MANAGEMENT | Facility: CLINIC | Age: 83
End: 2023-11-07

## 2023-11-07 DIAGNOSIS — Z96.1 PRESENCE OF INTRAOCULAR LENS: ICD-10-CM

## 2023-11-07 DIAGNOSIS — Z79.82 LONG TERM (CURRENT) USE OF ASPIRIN: ICD-10-CM

## 2023-11-07 DIAGNOSIS — E78.00 PURE HYPERCHOLESTEROLEMIA, UNSPECIFIED: ICD-10-CM

## 2023-11-07 DIAGNOSIS — I25.2 OLD MYOCARDIAL INFARCTION: ICD-10-CM

## 2023-11-07 DIAGNOSIS — Z79.85 LONG-TERM (CURRENT) USE OF INJECTABLE NON-INSULIN ANTIDIABETIC DRUGS: ICD-10-CM

## 2023-11-07 DIAGNOSIS — Z79.01 LONG TERM (CURRENT) USE OF ANTICOAGULANTS: ICD-10-CM

## 2023-11-07 DIAGNOSIS — E11.36 TYPE 2 DIABETES MELLITUS WITH DIABETIC CATARACT: ICD-10-CM

## 2023-11-07 DIAGNOSIS — I25.10 ATHEROSCLEROTIC HEART DISEASE OF NATIVE CORONARY ARTERY WITHOUT ANGINA PECTORIS: ICD-10-CM

## 2023-11-07 DIAGNOSIS — I82.91 CHRONIC EMBOLISM AND THROMBOSIS OF UNSPECIFIED VEIN: ICD-10-CM

## 2023-11-07 DIAGNOSIS — Z98.890 OTHER SPECIFIED POSTPROCEDURAL STATES: Chronic | ICD-10-CM

## 2023-11-07 DIAGNOSIS — Z98.41 CATARACT EXTRACTION STATUS, RIGHT EYE: ICD-10-CM

## 2023-11-07 DIAGNOSIS — H26.8 OTHER SPECIFIED CATARACT: ICD-10-CM

## 2023-11-07 DIAGNOSIS — G57.33 LESION OF LATERAL POPLITEAL NERVE, BILATERAL LOWER LIMBS: ICD-10-CM

## 2023-11-07 DIAGNOSIS — Z79.84 LONG TERM (CURRENT) USE OF ORAL HYPOGLYCEMIC DRUGS: ICD-10-CM

## 2023-11-07 DIAGNOSIS — I10 ESSENTIAL (PRIMARY) HYPERTENSION: ICD-10-CM

## 2023-11-07 LAB
INR PPP: 3.4 RATIO
POCT-PROTHROMBIN TIME: 40.7 SECS
QUALITY CONTROL: YES

## 2023-11-07 PROCEDURE — 85610 PROTHROMBIN TIME: CPT

## 2023-11-07 PROCEDURE — 99211 OFF/OP EST MAY X REQ PHY/QHP: CPT

## 2023-11-07 PROCEDURE — 36416 COLLJ CAPILLARY BLOOD SPEC: CPT

## 2023-11-08 DIAGNOSIS — I82.91 CHRONIC EMBOLISM AND THROMBOSIS OF UNSPECIFIED VEIN: ICD-10-CM

## 2023-11-08 DIAGNOSIS — Z79.01 LONG TERM (CURRENT) USE OF ANTICOAGULANTS: ICD-10-CM

## 2023-12-05 ENCOUNTER — OUTPATIENT (OUTPATIENT)
Dept: OUTPATIENT SERVICES | Facility: HOSPITAL | Age: 83
LOS: 1 days | End: 2023-12-05
Payer: MEDICARE

## 2023-12-05 ENCOUNTER — APPOINTMENT (OUTPATIENT)
Dept: MEDICATION MANAGEMENT | Facility: CLINIC | Age: 83
End: 2023-12-05

## 2023-12-05 DIAGNOSIS — Z79.01 LONG TERM (CURRENT) USE OF ANTICOAGULANTS: ICD-10-CM

## 2023-12-05 DIAGNOSIS — Z98.890 OTHER SPECIFIED POSTPROCEDURAL STATES: Chronic | ICD-10-CM

## 2023-12-05 DIAGNOSIS — I82.91 CHRONIC EMBOLISM AND THROMBOSIS OF UNSPECIFIED VEIN: ICD-10-CM

## 2023-12-05 LAB
INR PPP: 2.3 RATIO
POCT-PROTHROMBIN TIME: 27.3 SECS
QUALITY CONTROL: YES

## 2023-12-05 PROCEDURE — 36416 COLLJ CAPILLARY BLOOD SPEC: CPT

## 2023-12-05 PROCEDURE — 99211 OFF/OP EST MAY X REQ PHY/QHP: CPT

## 2023-12-05 PROCEDURE — 85610 PROTHROMBIN TIME: CPT

## 2023-12-06 DIAGNOSIS — Z79.01 LONG TERM (CURRENT) USE OF ANTICOAGULANTS: ICD-10-CM

## 2023-12-06 DIAGNOSIS — I82.91 CHRONIC EMBOLISM AND THROMBOSIS OF UNSPECIFIED VEIN: ICD-10-CM

## 2024-01-24 ENCOUNTER — APPOINTMENT (OUTPATIENT)
Dept: MEDICATION MANAGEMENT | Facility: CLINIC | Age: 84
End: 2024-01-24

## 2024-01-24 ENCOUNTER — OUTPATIENT (OUTPATIENT)
Dept: OUTPATIENT SERVICES | Facility: HOSPITAL | Age: 84
LOS: 1 days | End: 2024-01-24
Payer: MEDICARE

## 2024-01-24 DIAGNOSIS — Z79.01 LONG TERM (CURRENT) USE OF ANTICOAGULANTS: ICD-10-CM

## 2024-01-24 DIAGNOSIS — Z98.890 OTHER SPECIFIED POSTPROCEDURAL STATES: Chronic | ICD-10-CM

## 2024-01-24 DIAGNOSIS — I82.91 CHRONIC EMBOLISM AND THROMBOSIS OF UNSPECIFIED VEIN: ICD-10-CM

## 2024-01-24 LAB
INR PPP: 2.3 RATIO
POCT-PROTHROMBIN TIME: 27 SECS
QUALITY CONTROL: YES

## 2024-01-24 PROCEDURE — 99211 OFF/OP EST MAY X REQ PHY/QHP: CPT

## 2024-01-24 PROCEDURE — 85610 PROTHROMBIN TIME: CPT

## 2024-01-24 PROCEDURE — 36416 COLLJ CAPILLARY BLOOD SPEC: CPT

## 2024-01-25 DIAGNOSIS — Z79.01 LONG TERM (CURRENT) USE OF ANTICOAGULANTS: ICD-10-CM

## 2024-01-25 DIAGNOSIS — I82.91 CHRONIC EMBOLISM AND THROMBOSIS OF UNSPECIFIED VEIN: ICD-10-CM

## 2024-03-01 ENCOUNTER — RX RENEWAL (OUTPATIENT)
Age: 84
End: 2024-03-01

## 2024-03-01 RX ORDER — WARFARIN 5 MG/1
5 TABLET ORAL
Qty: 180 | Refills: 1 | Status: ACTIVE | COMMUNITY
Start: 2022-03-29 | End: 1900-01-01

## 2024-03-06 ENCOUNTER — APPOINTMENT (OUTPATIENT)
Dept: MEDICATION MANAGEMENT | Facility: CLINIC | Age: 84
End: 2024-03-06

## 2024-03-06 ENCOUNTER — OUTPATIENT (OUTPATIENT)
Dept: OUTPATIENT SERVICES | Facility: HOSPITAL | Age: 84
LOS: 1 days | End: 2024-03-06
Payer: MEDICARE

## 2024-03-06 DIAGNOSIS — Z79.01 LONG TERM (CURRENT) USE OF ANTICOAGULANTS: ICD-10-CM

## 2024-03-06 DIAGNOSIS — I82.91 CHRONIC EMBOLISM AND THROMBOSIS OF UNSPECIFIED VEIN: ICD-10-CM

## 2024-03-06 DIAGNOSIS — Z98.890 OTHER SPECIFIED POSTPROCEDURAL STATES: Chronic | ICD-10-CM

## 2024-03-06 LAB
INR PPP: 1.2 RATIO
POCT-PROTHROMBIN TIME: 14.4 SECS
QUALITY CONTROL: YES

## 2024-03-06 PROCEDURE — 99211 OFF/OP EST MAY X REQ PHY/QHP: CPT

## 2024-03-06 PROCEDURE — 36416 COLLJ CAPILLARY BLOOD SPEC: CPT

## 2024-03-06 PROCEDURE — 85610 PROTHROMBIN TIME: CPT

## 2024-03-07 DIAGNOSIS — Z79.01 LONG TERM (CURRENT) USE OF ANTICOAGULANTS: ICD-10-CM

## 2024-03-07 DIAGNOSIS — I82.91 CHRONIC EMBOLISM AND THROMBOSIS OF UNSPECIFIED VEIN: ICD-10-CM

## 2024-03-26 ENCOUNTER — OUTPATIENT (OUTPATIENT)
Dept: OUTPATIENT SERVICES | Facility: HOSPITAL | Age: 84
LOS: 1 days | End: 2024-03-26
Payer: MEDICARE

## 2024-03-26 ENCOUNTER — APPOINTMENT (OUTPATIENT)
Dept: MEDICATION MANAGEMENT | Facility: CLINIC | Age: 84
End: 2024-03-26

## 2024-03-26 DIAGNOSIS — Z98.890 OTHER SPECIFIED POSTPROCEDURAL STATES: Chronic | ICD-10-CM

## 2024-03-26 DIAGNOSIS — Z79.01 LONG TERM (CURRENT) USE OF ANTICOAGULANTS: ICD-10-CM

## 2024-03-26 DIAGNOSIS — I82.91 CHRONIC EMBOLISM AND THROMBOSIS OF UNSPECIFIED VEIN: ICD-10-CM

## 2024-03-26 LAB
INR PPP: 1.5 RATIO
POCT-PROTHROMBIN TIME: 17.5 SECS
QUALITY CONTROL: YES

## 2024-03-26 PROCEDURE — 36416 COLLJ CAPILLARY BLOOD SPEC: CPT

## 2024-03-26 PROCEDURE — 99211 OFF/OP EST MAY X REQ PHY/QHP: CPT

## 2024-03-26 PROCEDURE — 85610 PROTHROMBIN TIME: CPT

## 2024-03-27 DIAGNOSIS — I82.91 CHRONIC EMBOLISM AND THROMBOSIS OF UNSPECIFIED VEIN: ICD-10-CM

## 2024-03-27 DIAGNOSIS — Z79.01 LONG TERM (CURRENT) USE OF ANTICOAGULANTS: ICD-10-CM

## 2024-04-09 ENCOUNTER — OUTPATIENT (OUTPATIENT)
Dept: OUTPATIENT SERVICES | Facility: HOSPITAL | Age: 84
LOS: 1 days | End: 2024-04-09
Payer: MEDICARE

## 2024-04-09 ENCOUNTER — APPOINTMENT (OUTPATIENT)
Dept: MEDICATION MANAGEMENT | Facility: CLINIC | Age: 84
End: 2024-04-09

## 2024-04-09 DIAGNOSIS — Z79.01 LONG TERM (CURRENT) USE OF ANTICOAGULANTS: ICD-10-CM

## 2024-04-09 DIAGNOSIS — I82.91 CHRONIC EMBOLISM AND THROMBOSIS OF UNSPECIFIED VEIN: ICD-10-CM

## 2024-04-09 DIAGNOSIS — Z98.890 OTHER SPECIFIED POSTPROCEDURAL STATES: Chronic | ICD-10-CM

## 2024-04-09 LAB
INR PPP: 2.4 RATIO
POCT-PROTHROMBIN TIME: 20.4 SECS
QUALITY CONTROL: YES

## 2024-04-09 PROCEDURE — 36416 COLLJ CAPILLARY BLOOD SPEC: CPT

## 2024-04-09 PROCEDURE — 85610 PROTHROMBIN TIME: CPT

## 2024-04-09 PROCEDURE — 99211 OFF/OP EST MAY X REQ PHY/QHP: CPT

## 2024-04-10 DIAGNOSIS — I82.91 CHRONIC EMBOLISM AND THROMBOSIS OF UNSPECIFIED VEIN: ICD-10-CM

## 2024-04-10 DIAGNOSIS — Z79.01 LONG TERM (CURRENT) USE OF ANTICOAGULANTS: ICD-10-CM

## 2024-04-16 ENCOUNTER — APPOINTMENT (OUTPATIENT)
Dept: MEDICATION MANAGEMENT | Facility: CLINIC | Age: 84
End: 2024-04-16

## 2024-05-07 ENCOUNTER — OUTPATIENT (OUTPATIENT)
Dept: OUTPATIENT SERVICES | Facility: HOSPITAL | Age: 84
LOS: 1 days | End: 2024-05-07
Payer: MEDICARE

## 2024-05-07 ENCOUNTER — APPOINTMENT (OUTPATIENT)
Dept: MEDICATION MANAGEMENT | Facility: CLINIC | Age: 84
End: 2024-05-07

## 2024-05-07 DIAGNOSIS — I82.91 CHRONIC EMBOLISM AND THROMBOSIS OF UNSPECIFIED VEIN: ICD-10-CM

## 2024-05-07 DIAGNOSIS — Z98.890 OTHER SPECIFIED POSTPROCEDURAL STATES: Chronic | ICD-10-CM

## 2024-05-07 DIAGNOSIS — Z79.01 LONG TERM (CURRENT) USE OF ANTICOAGULANTS: ICD-10-CM

## 2024-05-07 LAB
INR PPP: 1.7 RATIO
POCT-PROTHROMBIN TIME: 20.1 SECS
QUALITY CONTROL: YES

## 2024-05-07 PROCEDURE — 99211 OFF/OP EST MAY X REQ PHY/QHP: CPT

## 2024-05-07 PROCEDURE — 85610 PROTHROMBIN TIME: CPT

## 2024-05-07 PROCEDURE — 36416 COLLJ CAPILLARY BLOOD SPEC: CPT

## 2024-05-08 DIAGNOSIS — I82.91 CHRONIC EMBOLISM AND THROMBOSIS OF UNSPECIFIED VEIN: ICD-10-CM

## 2024-05-08 DIAGNOSIS — Z79.01 LONG TERM (CURRENT) USE OF ANTICOAGULANTS: ICD-10-CM

## 2024-06-04 ENCOUNTER — OUTPATIENT (OUTPATIENT)
Dept: OUTPATIENT SERVICES | Facility: HOSPITAL | Age: 84
LOS: 1 days | End: 2024-06-04
Payer: MEDICARE

## 2024-06-04 ENCOUNTER — APPOINTMENT (OUTPATIENT)
Dept: MEDICATION MANAGEMENT | Facility: CLINIC | Age: 84
End: 2024-06-04

## 2024-06-04 DIAGNOSIS — Z79.01 LONG TERM (CURRENT) USE OF ANTICOAGULANTS: ICD-10-CM

## 2024-06-04 DIAGNOSIS — I82.91 CHRONIC EMBOLISM AND THROMBOSIS OF UNSPECIFIED VEIN: ICD-10-CM

## 2024-06-04 DIAGNOSIS — Z98.890 OTHER SPECIFIED POSTPROCEDURAL STATES: Chronic | ICD-10-CM

## 2024-06-04 LAB
INR PPP: 4.3 RATIO
POCT-PROTHROMBIN TIME: 52.1 SECS
QUALITY CONTROL: YES

## 2024-06-04 PROCEDURE — 36416 COLLJ CAPILLARY BLOOD SPEC: CPT

## 2024-06-04 PROCEDURE — 99211 OFF/OP EST MAY X REQ PHY/QHP: CPT

## 2024-06-04 PROCEDURE — 85610 PROTHROMBIN TIME: CPT

## 2024-06-05 DIAGNOSIS — Z79.01 LONG TERM (CURRENT) USE OF ANTICOAGULANTS: ICD-10-CM

## 2024-06-05 DIAGNOSIS — I82.91 CHRONIC EMBOLISM AND THROMBOSIS OF UNSPECIFIED VEIN: ICD-10-CM

## 2024-07-02 ENCOUNTER — OUTPATIENT (OUTPATIENT)
Dept: OUTPATIENT SERVICES | Facility: HOSPITAL | Age: 84
LOS: 1 days | End: 2024-07-02
Payer: MEDICARE

## 2024-07-02 ENCOUNTER — APPOINTMENT (OUTPATIENT)
Dept: MEDICATION MANAGEMENT | Facility: CLINIC | Age: 84
End: 2024-07-02

## 2024-07-02 DIAGNOSIS — I82.91 CHRONIC EMBOLISM AND THROMBOSIS OF UNSPECIFIED VEIN: ICD-10-CM

## 2024-07-02 DIAGNOSIS — Z98.890 OTHER SPECIFIED POSTPROCEDURAL STATES: Chronic | ICD-10-CM

## 2024-07-02 DIAGNOSIS — Z79.01 LONG TERM (CURRENT) USE OF ANTICOAGULANTS: ICD-10-CM

## 2024-07-02 LAB
INR PPP: 1.6 RATIO
POCT-PROTHROMBIN TIME: 19.9 SECS
QUALITY CONTROL: YES

## 2024-07-02 PROCEDURE — 99211 OFF/OP EST MAY X REQ PHY/QHP: CPT

## 2024-07-02 PROCEDURE — 36416 COLLJ CAPILLARY BLOOD SPEC: CPT

## 2024-07-02 PROCEDURE — 85610 PROTHROMBIN TIME: CPT

## 2024-07-03 DIAGNOSIS — I82.91 CHRONIC EMBOLISM AND THROMBOSIS OF UNSPECIFIED VEIN: ICD-10-CM

## 2024-07-03 DIAGNOSIS — Z79.01 LONG TERM (CURRENT) USE OF ANTICOAGULANTS: ICD-10-CM

## 2024-07-16 ENCOUNTER — APPOINTMENT (OUTPATIENT)
Dept: MEDICATION MANAGEMENT | Facility: CLINIC | Age: 84
End: 2024-07-16

## 2024-07-16 ENCOUNTER — OUTPATIENT (OUTPATIENT)
Dept: OUTPATIENT SERVICES | Facility: HOSPITAL | Age: 84
LOS: 1 days | End: 2024-07-16
Payer: MEDICARE

## 2024-07-16 DIAGNOSIS — I82.91 CHRONIC EMBOLISM AND THROMBOSIS OF UNSPECIFIED VEIN: ICD-10-CM

## 2024-07-16 DIAGNOSIS — Z79.01 LONG TERM (CURRENT) USE OF ANTICOAGULANTS: ICD-10-CM

## 2024-07-16 DIAGNOSIS — Z98.890 OTHER SPECIFIED POSTPROCEDURAL STATES: Chronic | ICD-10-CM

## 2024-07-16 LAB
INR PPP: 2.7 RATIO
POCT-PROTHROMBIN TIME: 32.4 SECS
QUALITY CONTROL: YES

## 2024-07-16 PROCEDURE — 36416 COLLJ CAPILLARY BLOOD SPEC: CPT

## 2024-07-16 PROCEDURE — 99211 OFF/OP EST MAY X REQ PHY/QHP: CPT

## 2024-07-16 PROCEDURE — 85610 PROTHROMBIN TIME: CPT

## 2024-07-17 DIAGNOSIS — I82.91 CHRONIC EMBOLISM AND THROMBOSIS OF UNSPECIFIED VEIN: ICD-10-CM

## 2024-07-17 DIAGNOSIS — Z79.01 LONG TERM (CURRENT) USE OF ANTICOAGULANTS: ICD-10-CM

## 2024-07-23 NOTE — ASU PATIENT PROFILE, ADULT - PAIN CHRONIC, PROFILE
Quality 431: Preventive Care And Screening: Unhealthy Alcohol Use - Screening: Patient screened for unhealthy alcohol use using a single question and scores less than 2 times per year Detail Level: Zone Quality 226: Preventive Care And Screening: Tobacco Use: Screening And Cessation Intervention: Patient screened for tobacco use and is an ex/non-smoker Quality 130: Documentation Of Current Medications In The Medical Record: Current Medications Documented no

## 2024-08-13 ENCOUNTER — APPOINTMENT (OUTPATIENT)
Dept: MEDICATION MANAGEMENT | Facility: CLINIC | Age: 84
End: 2024-08-13

## 2024-08-13 LAB
INR PPP: 1.8 RATIO
POCT-PROTHROMBIN TIME: 21.7 SECS
QUALITY CONTROL: YES

## 2024-09-10 ENCOUNTER — OUTPATIENT (OUTPATIENT)
Dept: OUTPATIENT SERVICES | Facility: HOSPITAL | Age: 84
LOS: 1 days | End: 2024-09-10
Payer: MEDICARE

## 2024-09-10 ENCOUNTER — APPOINTMENT (OUTPATIENT)
Dept: MEDICATION MANAGEMENT | Facility: CLINIC | Age: 84
End: 2024-09-10

## 2024-09-10 DIAGNOSIS — Z79.01 LONG TERM (CURRENT) USE OF ANTICOAGULANTS: ICD-10-CM

## 2024-09-10 DIAGNOSIS — I82.91 CHRONIC EMBOLISM AND THROMBOSIS OF UNSPECIFIED VEIN: ICD-10-CM

## 2024-09-10 DIAGNOSIS — Z98.890 OTHER SPECIFIED POSTPROCEDURAL STATES: Chronic | ICD-10-CM

## 2024-09-10 LAB
INR PPP: 2 RATIO
POCT-PROTHROMBIN TIME: 24.6 SECS
QUALITY CONTROL: YES

## 2024-09-10 PROCEDURE — 36416 COLLJ CAPILLARY BLOOD SPEC: CPT

## 2024-09-10 PROCEDURE — 99211 OFF/OP EST MAY X REQ PHY/QHP: CPT

## 2024-09-10 PROCEDURE — 85610 PROTHROMBIN TIME: CPT

## 2024-09-11 DIAGNOSIS — I82.91 CHRONIC EMBOLISM AND THROMBOSIS OF UNSPECIFIED VEIN: ICD-10-CM

## 2024-09-11 DIAGNOSIS — Z79.01 LONG TERM (CURRENT) USE OF ANTICOAGULANTS: ICD-10-CM

## 2024-10-15 ENCOUNTER — APPOINTMENT (OUTPATIENT)
Dept: MEDICATION MANAGEMENT | Facility: CLINIC | Age: 84
End: 2024-10-15

## 2024-10-15 ENCOUNTER — OUTPATIENT (OUTPATIENT)
Dept: OUTPATIENT SERVICES | Facility: HOSPITAL | Age: 84
LOS: 1 days | End: 2024-10-15
Payer: MEDICARE

## 2024-10-15 DIAGNOSIS — Z98.890 OTHER SPECIFIED POSTPROCEDURAL STATES: Chronic | ICD-10-CM

## 2024-10-15 DIAGNOSIS — I82.91 CHRONIC EMBOLISM AND THROMBOSIS OF UNSPECIFIED VEIN: ICD-10-CM

## 2024-10-15 DIAGNOSIS — Z79.01 LONG TERM (CURRENT) USE OF ANTICOAGULANTS: ICD-10-CM

## 2024-10-15 LAB
INR PPP: 2.3 RATIO
POCT-PROTHROMBIN TIME: 27.2 SECS
QUALITY CONTROL: YES

## 2024-10-15 PROCEDURE — 85610 PROTHROMBIN TIME: CPT

## 2024-10-15 PROCEDURE — 99211 OFF/OP EST MAY X REQ PHY/QHP: CPT

## 2024-10-15 PROCEDURE — 36416 COLLJ CAPILLARY BLOOD SPEC: CPT

## 2024-10-16 DIAGNOSIS — I82.91 CHRONIC EMBOLISM AND THROMBOSIS OF UNSPECIFIED VEIN: ICD-10-CM

## 2024-10-16 DIAGNOSIS — Z79.01 LONG TERM (CURRENT) USE OF ANTICOAGULANTS: ICD-10-CM

## 2024-11-19 ENCOUNTER — OUTPATIENT (OUTPATIENT)
Dept: OUTPATIENT SERVICES | Facility: HOSPITAL | Age: 84
LOS: 1 days | End: 2024-11-19
Payer: MEDICARE

## 2024-11-19 ENCOUNTER — APPOINTMENT (OUTPATIENT)
Dept: MEDICATION MANAGEMENT | Facility: CLINIC | Age: 84
End: 2024-11-19

## 2024-11-19 DIAGNOSIS — I82.91 CHRONIC EMBOLISM AND THROMBOSIS OF UNSPECIFIED VEIN: ICD-10-CM

## 2024-11-19 DIAGNOSIS — Z79.01 LONG TERM (CURRENT) USE OF ANTICOAGULANTS: ICD-10-CM

## 2024-11-19 DIAGNOSIS — Z98.890 OTHER SPECIFIED POSTPROCEDURAL STATES: Chronic | ICD-10-CM

## 2024-11-19 LAB
INR PPP: 2 RATIO
POCT-PROTHROMBIN TIME: 24.4 SECS
QUALITY CONTROL: YES

## 2024-11-19 PROCEDURE — 36416 COLLJ CAPILLARY BLOOD SPEC: CPT

## 2024-11-19 PROCEDURE — 85610 PROTHROMBIN TIME: CPT

## 2024-11-19 PROCEDURE — 99211 OFF/OP EST MAY X REQ PHY/QHP: CPT

## 2024-11-20 DIAGNOSIS — Z79.01 LONG TERM (CURRENT) USE OF ANTICOAGULANTS: ICD-10-CM

## 2024-11-20 DIAGNOSIS — I82.91 CHRONIC EMBOLISM AND THROMBOSIS OF UNSPECIFIED VEIN: ICD-10-CM

## 2024-12-31 ENCOUNTER — OUTPATIENT (OUTPATIENT)
Dept: OUTPATIENT SERVICES | Facility: HOSPITAL | Age: 84
LOS: 1 days | End: 2024-12-31
Payer: MEDICARE

## 2024-12-31 ENCOUNTER — APPOINTMENT (OUTPATIENT)
Dept: MEDICATION MANAGEMENT | Facility: CLINIC | Age: 84
End: 2024-12-31

## 2024-12-31 DIAGNOSIS — Z98.890 OTHER SPECIFIED POSTPROCEDURAL STATES: Chronic | ICD-10-CM

## 2024-12-31 DIAGNOSIS — I82.91 CHRONIC EMBOLISM AND THROMBOSIS OF UNSPECIFIED VEIN: ICD-10-CM

## 2024-12-31 DIAGNOSIS — Z79.01 LONG TERM (CURRENT) USE OF ANTICOAGULANTS: ICD-10-CM

## 2024-12-31 LAB
INR PPP: 1.8 RATIO
POCT-PROTHROMBIN TIME: 21.1 SECS
QUALITY CONTROL: YES

## 2024-12-31 PROCEDURE — 85610 PROTHROMBIN TIME: CPT

## 2024-12-31 PROCEDURE — 99211 OFF/OP EST MAY X REQ PHY/QHP: CPT

## 2024-12-31 PROCEDURE — 36416 COLLJ CAPILLARY BLOOD SPEC: CPT

## 2025-01-01 DIAGNOSIS — I82.91 CHRONIC EMBOLISM AND THROMBOSIS OF UNSPECIFIED VEIN: ICD-10-CM

## 2025-01-01 DIAGNOSIS — Z79.01 LONG TERM (CURRENT) USE OF ANTICOAGULANTS: ICD-10-CM

## 2025-01-28 ENCOUNTER — OUTPATIENT (OUTPATIENT)
Dept: OUTPATIENT SERVICES | Facility: HOSPITAL | Age: 85
LOS: 1 days | End: 2025-01-28
Payer: MEDICARE

## 2025-01-28 ENCOUNTER — APPOINTMENT (OUTPATIENT)
Dept: MEDICATION MANAGEMENT | Facility: CLINIC | Age: 85
End: 2025-01-28

## 2025-01-28 DIAGNOSIS — Z98.890 OTHER SPECIFIED POSTPROCEDURAL STATES: Chronic | ICD-10-CM

## 2025-01-28 DIAGNOSIS — I82.91 CHRONIC EMBOLISM AND THROMBOSIS OF UNSPECIFIED VEIN: ICD-10-CM

## 2025-01-28 DIAGNOSIS — Z79.01 LONG TERM (CURRENT) USE OF ANTICOAGULANTS: ICD-10-CM

## 2025-01-28 LAB
INR PPP: 2.7 RATIO
QUALITY CONTROL: YES

## 2025-01-28 PROCEDURE — 85610 PROTHROMBIN TIME: CPT

## 2025-01-28 PROCEDURE — 36416 COLLJ CAPILLARY BLOOD SPEC: CPT

## 2025-01-28 PROCEDURE — 99211 OFF/OP EST MAY X REQ PHY/QHP: CPT

## 2025-01-29 DIAGNOSIS — Z79.01 LONG TERM (CURRENT) USE OF ANTICOAGULANTS: ICD-10-CM

## 2025-01-29 DIAGNOSIS — I82.91 CHRONIC EMBOLISM AND THROMBOSIS OF UNSPECIFIED VEIN: ICD-10-CM

## 2025-03-04 ENCOUNTER — OUTPATIENT (OUTPATIENT)
Dept: OUTPATIENT SERVICES | Facility: HOSPITAL | Age: 85
LOS: 1 days | End: 2025-03-04
Payer: MEDICARE

## 2025-03-04 ENCOUNTER — APPOINTMENT (OUTPATIENT)
Dept: MEDICATION MANAGEMENT | Facility: CLINIC | Age: 85
End: 2025-03-04

## 2025-03-04 DIAGNOSIS — Z98.890 OTHER SPECIFIED POSTPROCEDURAL STATES: Chronic | ICD-10-CM

## 2025-03-04 DIAGNOSIS — Z79.01 LONG TERM (CURRENT) USE OF ANTICOAGULANTS: ICD-10-CM

## 2025-03-04 DIAGNOSIS — I82.91 CHRONIC EMBOLISM AND THROMBOSIS OF UNSPECIFIED VEIN: ICD-10-CM

## 2025-03-04 LAB
INR PPP: 2.8 RATIO
QUALITY CONTROL: YES

## 2025-03-04 PROCEDURE — 85610 PROTHROMBIN TIME: CPT

## 2025-03-04 PROCEDURE — 99211 OFF/OP EST MAY X REQ PHY/QHP: CPT

## 2025-03-04 PROCEDURE — 36416 COLLJ CAPILLARY BLOOD SPEC: CPT

## 2025-03-05 DIAGNOSIS — Z79.01 LONG TERM (CURRENT) USE OF ANTICOAGULANTS: ICD-10-CM

## 2025-03-05 DIAGNOSIS — I82.91 CHRONIC EMBOLISM AND THROMBOSIS OF UNSPECIFIED VEIN: ICD-10-CM

## 2025-04-15 ENCOUNTER — OUTPATIENT (OUTPATIENT)
Dept: OUTPATIENT SERVICES | Facility: HOSPITAL | Age: 85
LOS: 1 days | End: 2025-04-15
Payer: MEDICARE

## 2025-04-15 ENCOUNTER — APPOINTMENT (OUTPATIENT)
Dept: MEDICATION MANAGEMENT | Facility: CLINIC | Age: 85
End: 2025-04-15

## 2025-04-15 DIAGNOSIS — I82.91 CHRONIC EMBOLISM AND THROMBOSIS OF UNSPECIFIED VEIN: ICD-10-CM

## 2025-04-15 DIAGNOSIS — Z79.01 LONG TERM (CURRENT) USE OF ANTICOAGULANTS: ICD-10-CM

## 2025-04-15 LAB
INR PPP: 2.4 RATIO
QUALITY CONTROL: YES

## 2025-04-15 PROCEDURE — 99211 OFF/OP EST MAY X REQ PHY/QHP: CPT

## 2025-04-15 PROCEDURE — 36416 COLLJ CAPILLARY BLOOD SPEC: CPT

## 2025-04-15 PROCEDURE — 85610 PROTHROMBIN TIME: CPT

## 2025-04-16 DIAGNOSIS — Z79.01 LONG TERM (CURRENT) USE OF ANTICOAGULANTS: ICD-10-CM

## 2025-04-16 DIAGNOSIS — I82.91 CHRONIC EMBOLISM AND THROMBOSIS OF UNSPECIFIED VEIN: ICD-10-CM

## 2025-05-27 ENCOUNTER — OUTPATIENT (OUTPATIENT)
Dept: OUTPATIENT SERVICES | Facility: HOSPITAL | Age: 85
LOS: 1 days | End: 2025-05-27
Payer: MEDICARE

## 2025-05-27 ENCOUNTER — APPOINTMENT (OUTPATIENT)
Dept: MEDICATION MANAGEMENT | Facility: CLINIC | Age: 85
End: 2025-05-27

## 2025-05-27 DIAGNOSIS — I82.91 CHRONIC EMBOLISM AND THROMBOSIS OF UNSPECIFIED VEIN: ICD-10-CM

## 2025-05-27 DIAGNOSIS — Z98.890 OTHER SPECIFIED POSTPROCEDURAL STATES: Chronic | ICD-10-CM

## 2025-05-27 DIAGNOSIS — Z79.01 LONG TERM (CURRENT) USE OF ANTICOAGULANTS: ICD-10-CM

## 2025-05-27 LAB
INR PPP: 2.3 RATIO
POCT-PROTHROMBIN TIME: 27.8 SECS
QUALITY CONTROL: YES

## 2025-05-27 PROCEDURE — 99211 OFF/OP EST MAY X REQ PHY/QHP: CPT

## 2025-05-27 PROCEDURE — 36416 COLLJ CAPILLARY BLOOD SPEC: CPT

## 2025-05-27 PROCEDURE — 85610 PROTHROMBIN TIME: CPT

## 2025-05-28 DIAGNOSIS — I82.91 CHRONIC EMBOLISM AND THROMBOSIS OF UNSPECIFIED VEIN: ICD-10-CM

## 2025-05-28 DIAGNOSIS — Z79.01 LONG TERM (CURRENT) USE OF ANTICOAGULANTS: ICD-10-CM

## 2025-07-08 ENCOUNTER — APPOINTMENT (OUTPATIENT)
Dept: MEDICATION MANAGEMENT | Facility: CLINIC | Age: 85
End: 2025-07-08

## 2025-07-08 ENCOUNTER — OUTPATIENT (OUTPATIENT)
Dept: OUTPATIENT SERVICES | Facility: HOSPITAL | Age: 85
LOS: 1 days | End: 2025-07-08
Payer: MEDICARE

## 2025-07-08 DIAGNOSIS — Z98.890 OTHER SPECIFIED POSTPROCEDURAL STATES: Chronic | ICD-10-CM

## 2025-07-08 DIAGNOSIS — Z79.01 LONG TERM (CURRENT) USE OF ANTICOAGULANTS: ICD-10-CM

## 2025-07-08 DIAGNOSIS — I82.91 CHRONIC EMBOLISM AND THROMBOSIS OF UNSPECIFIED VEIN: ICD-10-CM

## 2025-07-08 LAB
INR PPP: 2.7 RATIO
POCT-PROTHROMBIN TIME: 32 SECS
QUALITY CONTROL: YES

## 2025-07-08 PROCEDURE — 99211 OFF/OP EST MAY X REQ PHY/QHP: CPT

## 2025-07-08 PROCEDURE — 36416 COLLJ CAPILLARY BLOOD SPEC: CPT

## 2025-07-08 PROCEDURE — 85610 PROTHROMBIN TIME: CPT

## 2025-07-09 DIAGNOSIS — I82.91 CHRONIC EMBOLISM AND THROMBOSIS OF UNSPECIFIED VEIN: ICD-10-CM

## 2025-07-09 DIAGNOSIS — Z79.01 LONG TERM (CURRENT) USE OF ANTICOAGULANTS: ICD-10-CM

## 2025-08-21 ENCOUNTER — OUTPATIENT (OUTPATIENT)
Dept: OUTPATIENT SERVICES | Facility: HOSPITAL | Age: 85
LOS: 1 days | End: 2025-08-21
Payer: MEDICARE

## 2025-08-21 ENCOUNTER — APPOINTMENT (OUTPATIENT)
Dept: MEDICATION MANAGEMENT | Facility: CLINIC | Age: 85
End: 2025-08-21

## 2025-08-21 DIAGNOSIS — I82.91 CHRONIC EMBOLISM AND THROMBOSIS OF UNSPECIFIED VEIN: ICD-10-CM

## 2025-08-21 DIAGNOSIS — Z79.01 LONG TERM (CURRENT) USE OF ANTICOAGULANTS: ICD-10-CM

## 2025-08-21 DIAGNOSIS — Z98.890 OTHER SPECIFIED POSTPROCEDURAL STATES: Chronic | ICD-10-CM

## 2025-08-21 LAB
INR PPP: 3.4 RATIO
POCT-PROTHROMBIN TIME: 41.3 SECS
QUALITY CONTROL: YES

## 2025-08-21 PROCEDURE — 85610 PROTHROMBIN TIME: CPT

## 2025-08-21 PROCEDURE — 36416 COLLJ CAPILLARY BLOOD SPEC: CPT

## 2025-08-21 PROCEDURE — 99212 OFFICE O/P EST SF 10 MIN: CPT

## 2025-08-22 DIAGNOSIS — Z79.01 LONG TERM (CURRENT) USE OF ANTICOAGULANTS: ICD-10-CM

## 2025-08-22 DIAGNOSIS — I82.91 CHRONIC EMBOLISM AND THROMBOSIS OF UNSPECIFIED VEIN: ICD-10-CM

## 2025-09-18 ENCOUNTER — APPOINTMENT (OUTPATIENT)
Dept: MEDICATION MANAGEMENT | Facility: CLINIC | Age: 85
End: 2025-09-18

## 2025-09-18 LAB
INR PPP: 3.7 RATIO
POCT-PROTHROMBIN TIME: 44.3 SECS
QUALITY CONTROL: YES